# Patient Record
Sex: MALE | Race: WHITE | Employment: OTHER | URBAN - METROPOLITAN AREA
[De-identification: names, ages, dates, MRNs, and addresses within clinical notes are randomized per-mention and may not be internally consistent; named-entity substitution may affect disease eponyms.]

---

## 2018-08-27 ENCOUNTER — HOSPITAL ENCOUNTER (OUTPATIENT)
Dept: GENERAL RADIOLOGY | Age: 83
Discharge: HOME | End: 2018-08-27
Payer: MEDICARE

## 2018-08-27 DIAGNOSIS — R13.10 PROBLEMS WITH SWALLOWING AND MASTICATION: ICD-10-CM

## 2018-08-27 PROCEDURE — 74220 X-RAY XM ESOPHAGUS 1CNTRST: CPT

## 2018-08-28 ENCOUNTER — HOSPITAL ENCOUNTER (OUTPATIENT)
Dept: SPEECH THERAPY | Age: 83
Setting detail: THERAPIES SERIES
Discharge: HOME | End: 2018-08-28
Payer: MEDICARE

## 2018-08-28 DIAGNOSIS — F44.4 HYPERFUNCTIONAL DYSPHONIA: Primary | ICD-10-CM

## 2018-08-28 PROCEDURE — G9172 VOICE GOAL STATUS: HCPCS

## 2018-08-28 PROCEDURE — G9171 VOICE CURRENT STATUS: HCPCS

## 2018-08-28 PROCEDURE — 92524 BEHAVRAL QUALIT ANALYS VOICE: CPT

## 2018-08-28 NOTE — PROGRESS NOTES
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  ICD-9-CM: 300.11          Speech Therapy Certification Form    Plan of Care/Treatment to date:  [x] Speech-Language Evaluation/Treatment    [] Dysphagia Evaluation/Treatment        [] Dysphagia Treatment via Neuromuscular Electrical Stimulation (NMES)   [] Modified Barium Swallowing Study   [] Fiberoptic Endoscopic Evaluation of Swallowing (FEES)  [] Cognitive-Linguistic Skills Development  [x] Voice evaluation and Treatment      [] Evaluation, modification, and Training of Voice Prosthetic     [] Evaluation for Speech-Generating Augmentative and Alternative Communication Device   [] Therapeutic Services for the use of Speech-Generating Device. [] Other:          Frequency/Duration:  # Days per week: [] 1 day # Weeks: [] 1 week [] 5 weeks      [x] 2 days? [] 2 weeks [x] 6 weeks     [] 3 days   [] 3 weeks [] 7 weeks     [] 4 days   [] 4 weeks [] 8 weeks    Rehab Potential: [] Excellent [x] Good [] Fair  [] Poor       Electronically signed by: PERICO Hernandez  Phone: [phone]  Fax: [phone]    If you have any questions or concerns, please don't hesitate to call.   Thank you for your referral.      Physician Signature:________________________________Date:__________________  By signing above, therapists plan is approved by physician      Davina Hitchcock Runkelen  PP#[id number]  Speech-Language Pathologist  Phone #: [phone]  Fax #: [phone]

## 2018-08-28 NOTE — PROGRESS NOTES
[facility] - Voice      Patient: Yinka Richmond  : 1931  MRN: [id number]  Date: 2018  Electronically Signed by: Catarina Garcia MA, CCC-SLP       Note: This FCM should not be used for individuals who have had a laryngectomy or tracheotomy, or for individuals with resonance disorders. []  Level 1 The individual is unable to use voice to communicate. Alternative means for communicating are used all of the time. The individual cannot participate in vocational, avocaional, and social activities requiring voice. []  Level 2 Voice is not functional for communication most of the time. Alternative means for communicating must be used most of the time. The individual's participation in vocational, avocational, and social activities is significantly limited all of the time.      []  Level 3  Voice is functional for communication, but is consistently distracting and interferes with communication by drawing attention to itself. Participation in vocational, avocational, and social activities is limited most of the time. [x]  Level 4 Voice is functional for communication, but sometimes distracting. The individual's ability to participate in vocational, avocational, and social activities requiring voice is occasionally affected in low-vocal demand activities, but consistently affected in high-vocal demand activities. []  Level 5 Voice occasionally sounds normal with self-monitoring, but there is some situational variation. The individual's ability to participate in vocational, avocational, and social activities requiring voice is rarely affected in low-vocal demand activities, but is occasionally affected in high-vocal demand activities. []  Level 6 Voice sounds normal most of the time across all settings and situations. Self-monitoring is consistent when needed.   The individual's ability to participate in vocational, avocational, and social activities requiring voice is not affected in low-vocal demand activities, but is rarely affected in high-vocal demand activities. []  Level 7 The individual's ability to successfully and independently participate in vocational, avocational, and social activities requiring high-or low-vocal demands is not limited by voice. Self-monitoring is effectively used, but only occasionally needed.

## 2018-09-05 ENCOUNTER — HOSPITAL ENCOUNTER (OUTPATIENT)
Dept: SPEECH THERAPY | Age: 83
Setting detail: THERAPIES SERIES
Discharge: HOME | End: 2018-09-05
Payer: MEDICARE

## 2018-09-05 PROCEDURE — 92507 TX SP LANG VOICE COMM INDIV: CPT

## 2018-09-10 ENCOUNTER — HOSPITAL ENCOUNTER (OUTPATIENT)
Dept: SPEECH THERAPY | Age: 83
Setting detail: THERAPIES SERIES
Discharge: HOME | End: 2018-09-10
Payer: MEDICARE

## 2018-09-10 PROCEDURE — 92507 TX SP LANG VOICE COMM INDIV: CPT

## 2018-09-10 NOTE — FLOWSHEET NOTE
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 He was prompt to today's session. He again reported that he has increased his daily water intake (stated he had ~40 oz of water yesterday). The pt reported completing previously reviewed exercises daily and without difficulty. Objective:   Short-term Goals:  Goal 1: The pt will prolong a single vowel production for 12 seconds with max cues to target improved breath support:  - Goal targeted indirectly through other treatment tasks. Goal 2: The pt will verbally produce single sentence at an average of 75 dB or >, mod cues for improved volume:  - Goal targeted indirectly through other treatment tasks. Goal 3: The pt will verbally produce single sentences without dysphonia with 70% acccuracy, max cues:  - Goal targeted indirectly through other treatment tasks. Other Treatments:  - Today's session reviewed hyperfunctional vocal exercises, respiratory retraining techniques and vocal function exercises to indirectly target his current goals listed above. The pt benefited from min cues to complete majority of exercises.     - Hyperfunctional Voice Exercises:  · Raspberries: x 5 (significnat improvement noted; pt able to produce raspberries without any noted tension)  · Use of Kazoo (mid pitch

## 2018-09-11 ENCOUNTER — APPOINTMENT (OUTPATIENT)
Dept: SPEECH THERAPY | Age: 83
End: 2018-09-11
Payer: MEDICARE

## 2018-09-13 ENCOUNTER — HOSPITAL ENCOUNTER (OUTPATIENT)
Dept: SPEECH THERAPY | Age: 83
Setting detail: THERAPIES SERIES
Discharge: HOME | End: 2018-09-13
Payer: MEDICARE

## 2018-09-13 PROCEDURE — 92507 TX SP LANG VOICE COMM INDIV: CPT

## 2018-09-13 NOTE — FLOWSHEET NOTE
Speech-Language Pathology  Daily Treatment Note    Date:  2018    Patient Name:  Naren Contreras    :  1931  MRN: [id number]    Treatment Diagnosis:    Visit Diagnoses        Codes     Hyperfunctional dysphonia    -  Primary ICD-10-CM: F44.4  ICD-9-CM: 300.11       Insurance/Certification information:  [facility]  Referring Physician: Eda Lane MD    Plan of care signed (Y/N): Faxed   Visit# / total visits:   per MD  Pain level: 0/10     G-Code (if applicable):      Date / Visit # G-Code Applied:  /  Current:       18  Goal:  [address]     18    Progress Note: []  Yes  [x]  No  Next due by: Visit #10: 4/10 or 18      Subjective: The pt was his pleasant self. He reported having ~48 oz of water yesterday. He also stated his voice \"seems to be more hoarse today\" and that his voice \"seems to be better in the afternoon vs the mornings after I've been drinking water all day\". Objective:   Short-term Goals:  Goal 1: The pt will prolong a single vowel production for 12 seconds with max cues to target improved breath support:  - /e/: 10 seconds (avg of 3 trials); diaphragmatic breathing was reviewed prior to completion of this exercise     Goal 2: The pt will verbally produce single sentence at an average of 75 dB or >, mod cues for improved volume:  - Goal targeted indirectly through other treatment tasks. Goal 3: The pt will verbally produce single sentences without dysphonia with 70% acccuracy, max cues:  - Goal targeted indirectly through other treatment tasks. Other Treatments:  - Hyperfunctional Voice Exercises:  · Raspberries: x 5 (pt able to produce raspberries without any noted tension)  · Use of Kazoo (mid pitch level, mid to high pitch level and mid to low pitch level): did not directly target this date. Pt reported that he had left his Kazoo at home. · Resonant voice therapy (/M/ and /M/ with singing. Thyra Hermanns Thyra Hermanns \"me, my, mum, me\"):  · 1-syllable words

## 2018-09-17 ENCOUNTER — HOSPITAL ENCOUNTER (OUTPATIENT)
Dept: SPEECH THERAPY | Age: 83
Setting detail: THERAPIES SERIES
Discharge: HOME | End: 2018-09-17
Payer: MEDICARE

## 2018-09-17 PROCEDURE — 92507 TX SP LANG VOICE COMM INDIV: CPT

## 2018-09-17 NOTE — FLOWSHEET NOTE
Speech-Language Pathology  Daily Treatment Note    Date:  2018    Patient Name:  Damion Issa    :  1931  MRN: [id number]    Treatment Diagnosis:    Visit Diagnoses        Codes     Hyperfunctional dysphonia    -  Primary ICD-10-CM: F44.4  ICD-9-CM: 300.11       Insurance/Certification information:  [employer]  Referring Physician: Pa Casey MD    Plan of care signed (Y/N): Faxed   Visit# / total visits:   per MD  Pain level: 0/10     G-Code (if applicable):      Date / Visit # G-Code Applied:  /  Current:       18  Goal:  [address]     18    Progress Note: []  Yes  [x]  No  Next due by: Visit #10: 5/10 or 18      Subjective: The pt was his pleasant self. He stated that his voice was worse  evening so he \"rested\" his voice for the remainder of the evening. Objective:   Short-term Goals:  Goal 1: The pt will prolong a single vowel production for 12 seconds with max cues to target improved breath support:  - /m/ and /a/: 7 seconds (avg of 3 trials); diaphragmatic breathing was reviewed prior to completion of this exercise     Goal 2: The pt will verbally produce single sentence at an average of 75 dB or >, mod cues for improved volume:  - Goal targeted indirectly through other treatment tasks. Goal 3: The pt will verbally produce single sentences without dysphonia with 70% acccuracy, max cues:  - Goal targeted indirectly through other treatment tasks. Other Treatments:  -The Reflux Symptom Index (RSI) was administered this date, which looks at LPR (laryngopharyngeal reflux) and its symptoms and severity. The index includes 9 questions with each being scored from 0-5 (0=no problem at all and 5=severe problem) with a score of 11 or greater considered indicative of LPR.   The pt scored an 8 on the RSI.    - Hyperfunctional Voice Exercises:  · Raspberries: x 5 (pt able to produce raspberries without any noted tension)  -Phonation was added to trials. Pt was also encouraged to complete pitch changes (from low to high and high to low). He was able to complete with min cues. · Use of Kazoo (mid pitch level, mid to high pitch level and mid to low pitch level): did not directly target this date. Pt reported that he had left his Kazoo at home. · Resonant voice therapy (/M/ and /M/ with singing. Mickeal Rink Mickeal Rink \"me, my, mum, me\"):  · 1-syllable words again reviewed with frontal focus stressed. Pt again demonstrated increased hoarseness, so only 1-syllable words targeted     - Respiratory Retraining Exercises: x3-5 reps each  · Leopold Miser inhalation and exhalation  · Exhalation on \"sh\"  · Exhalation on /f/  · Exhalation on /z/  · Expel puff of air on /h/ and stiff through nose with mouth closed  · Expel puff of air on \"sha\" and sniff through nose with mouth closed  · Inhaling and exhaling while laying on back with book on abdomen for biofeedback for correct diaphragmatic breathing: did not directly target this date  · Inhale and exhale through nose  · Inhale through nose and exhale through pursed lips  · Inhale and exhale through pursed lips  · Inhale through nose and exhale through a straw  · Inhale and exhale through a straw  · Sniff x 2 and exhale through a straw  · Sniff x 2 and exhale though pursed lips  · Say \"ah\" projecting voice to the ceiling  · Dry swallows    - Vocal Function Exercises: did not directly target this date  · Prolong /e/ on musical note F  · Glide from lowest to highest note on /o/  · Glide from highest to lowest note on /o/  · Sustain /o/ on the following musical notes: C, D, E, F, G    -Easy Onset Exercises: reducing glottal attack / tension was emphasized   · Easy onset exercises reviewed using /h/ phoneme at the single word and single syllable level. Assessment:   Pt presented with increased hoarseness this date throughout exercises. Plan: Continue voice treatment 2 x week for 6 weeks.     Timed Code Treatment: 0 minutes    Total

## 2018-09-18 ENCOUNTER — APPOINTMENT (OUTPATIENT)
Dept: SPEECH THERAPY | Age: 83
End: 2018-09-18
Payer: MEDICARE

## 2018-09-20 ENCOUNTER — HOSPITAL ENCOUNTER (OUTPATIENT)
Dept: SPEECH THERAPY | Age: 83
Setting detail: THERAPIES SERIES
Discharge: HOME | End: 2018-09-20
Payer: MEDICARE

## 2018-09-20 PROCEDURE — 92507 TX SP LANG VOICE COMM INDIV: CPT

## 2018-09-24 ENCOUNTER — HOSPITAL ENCOUNTER (OUTPATIENT)
Dept: SPEECH THERAPY | Age: 83
Setting detail: THERAPIES SERIES
Discharge: HOME | End: 2018-09-24
Payer: MEDICARE

## 2018-09-24 PROCEDURE — 92507 TX SP LANG VOICE COMM INDIV: CPT

## 2018-09-24 NOTE — FLOWSHEET NOTE
Speech-Language Pathology  Daily Treatment Note    Date:  2018    Patient Name:  Marcia Santos    :  1931  MRN: [id number]    Treatment Diagnosis:    Visit Diagnoses        Codes     Hyperfunctional dysphonia    -  Primary ICD-10-CM: F44.4  ICD-9-CM: 300.11       Insurance/Certification information:  [facility] [employer]  Referring Physician: Gonzalo Chisholm MD    Plan of care signed (Y/N): Faxed   Visit# / total visits:   per MD  Pain level: 0/10     G-Code (if applicable):      Date / Visit # G-Code Applied:  /  Current:       18  Goal:  [address] [place] [address]     18    Progress Note: []  Yes  [x]  No  Next due by: Visit #10: 7/10 or 18      Subjective:    SLP noted that patient's voice sounded rough and patient responded, \"it is rough when I get up\". The pt said that his voice continues to be raspy at times but sometimes it is almost normal. The patient also noted feeling acid reflux last night that could be d/t bean soup he consumed and is in the process of scheduling an appointment with his doctor regarding his reflux. The pt said that, other than the morning, his voice is sounding more normal overall. Objective:   Short-term Goals:  Goal 1: The pt will prolong a single vowel production for 12 seconds with max cues to target improved breath support:  - \"Ah\": Up to 6.4 seconds with max cues for breath support out of 4 trials (6.4, 4.81, and 5.56 seconds). This was completed at the end of the session and his shorter production could be d/t VFE. Goal 2: The pt will verbally produce single sentence at an average of 75 dB or >, mod cues for improved volume:  - Goal targeted indirectly through other treatment tasks. Goal 3: The pt will verbally produce single sentences without dysphonia with 70% acccuracy, max cues:  - 80% (8/10) with max cues; targeted via having the pt repeat single sentences aloud.   GOAL MET    NEW GOAL:   The pt will verbally produce single sentences without

## 2018-09-27 ENCOUNTER — HOSPITAL ENCOUNTER (OUTPATIENT)
Dept: SPEECH THERAPY | Age: 83
Setting detail: THERAPIES SERIES
Discharge: HOME | End: 2018-09-27
Payer: MEDICARE

## 2018-09-27 PROCEDURE — 92507 TX SP LANG VOICE COMM INDIV: CPT

## 2018-09-27 PROCEDURE — G9172 VOICE GOAL STATUS: HCPCS

## 2018-09-27 PROCEDURE — G9173 VOICE D/C STATUS: HCPCS

## 2018-09-27 NOTE — PROGRESS NOTES
Outpatient Speech Therapy   Phone: [phone] Fax: [phone]    Speech Therapy Discharge Note         The following patient has been evaluated for therapy services. Please review the attached summary of the patient's plan of care, and verify that you agree with plan for additional therapy services at this time.      Thank you for the referral of this patient. Please sign the attached certification form.      Physician signature_______________________ Date________________  Waldo Salguero to: [facility] [phone]         Date: 2018        Patient Name:  Keith Aden    :  1931  MRN: [id number]  Treatment Diagnosis:         Visit Diagnoses        Codes     Hyperfunctional dysphonia    -  Primary ICD-10-CM: F44.4  ICD-9-CM: 300.11       Insurance/Certification information:  [place]  Referring Physician: Petra Meza MD    Plan of care signed (Y/N): Faxed   Visit# / total visits:   per MD  Pain level:      0/10          G-Code (if applicable):                                             Date / Visit # G-Code Applied:  /  Current:                                                       18  Goal:  [facility]                                                         18     Time Period for Report:  18 to 18 (8 sessions)  Cancels/No-shows to date:  None    Plan of Care/Treatment to date:  [] Speech-Language Evaluation/Treatment    [] Dysphagia Evaluation/Treatment        [] Dysphagia Treatment via Neuromuscular Electrical Stimulation (NMES)   [] Modified Barium Swallowing Study  [] Fiberoptic Endoscopic Evaluation of Swallowing (FEES)    [] Cognitive-Linguistic Skills Development  [x] Voice evaluation and Treatment      [] Evaluation, modification, and Training of Voice Prosthetic     [] Evaluation for Speech-Generating Augmentative and Alternative Communication Device   [] Therapeutic Services for the use of Speech-Generating Device.    []

## 2019-09-20 ENCOUNTER — HOSPITAL ENCOUNTER (OUTPATIENT)
Dept: CARDIAC REHAB | Age: 84
Setting detail: THERAPIES SERIES
Discharge: HOME | End: 2019-09-20
Payer: MEDICARE

## 2019-09-20 PROCEDURE — 93798 PHYS/QHP OP CAR RHAB W/ECG: CPT

## 2019-09-23 ENCOUNTER — HOSPITAL ENCOUNTER (OUTPATIENT)
Dept: CARDIAC REHAB | Age: 84
Setting detail: THERAPIES SERIES
Discharge: HOME | End: 2019-09-23
Payer: MEDICARE

## 2019-09-23 PROCEDURE — 93798 PHYS/QHP OP CAR RHAB W/ECG: CPT

## 2019-09-25 ENCOUNTER — HOSPITAL ENCOUNTER (OUTPATIENT)
Dept: CARDIAC REHAB | Age: 84
Setting detail: THERAPIES SERIES
Discharge: HOME | End: 2019-09-25
Payer: MEDICARE

## 2019-09-25 PROCEDURE — 93798 PHYS/QHP OP CAR RHAB W/ECG: CPT

## 2019-09-27 ENCOUNTER — HOSPITAL ENCOUNTER (OUTPATIENT)
Dept: CARDIAC REHAB | Age: 84
Setting detail: THERAPIES SERIES
Discharge: HOME | End: 2019-09-27
Payer: MEDICARE

## 2019-09-27 PROCEDURE — 93798 PHYS/QHP OP CAR RHAB W/ECG: CPT

## 2019-09-30 ENCOUNTER — HOSPITAL ENCOUNTER (OUTPATIENT)
Dept: CARDIAC REHAB | Age: 84
Setting detail: THERAPIES SERIES
Discharge: HOME | End: 2019-09-30
Payer: MEDICARE

## 2019-09-30 PROCEDURE — 93798 PHYS/QHP OP CAR RHAB W/ECG: CPT

## 2019-10-02 ENCOUNTER — HOSPITAL ENCOUNTER (OUTPATIENT)
Dept: CARDIAC REHAB | Age: 84
Setting detail: THERAPIES SERIES
Discharge: HOME | End: 2019-10-02
Payer: MEDICARE

## 2019-10-02 PROCEDURE — 93798 PHYS/QHP OP CAR RHAB W/ECG: CPT

## 2019-10-04 ENCOUNTER — HOSPITAL ENCOUNTER (OUTPATIENT)
Dept: CARDIAC REHAB | Age: 84
Setting detail: THERAPIES SERIES
Discharge: HOME | End: 2019-10-04
Payer: MEDICARE

## 2019-10-04 PROCEDURE — 93798 PHYS/QHP OP CAR RHAB W/ECG: CPT

## 2019-10-07 ENCOUNTER — HOSPITAL ENCOUNTER (OUTPATIENT)
Dept: CARDIAC REHAB | Age: 84
Setting detail: THERAPIES SERIES
Discharge: HOME | End: 2019-10-07
Payer: MEDICARE

## 2019-10-07 PROCEDURE — 93798 PHYS/QHP OP CAR RHAB W/ECG: CPT

## 2019-10-09 ENCOUNTER — HOSPITAL ENCOUNTER (OUTPATIENT)
Dept: CARDIAC REHAB | Age: 84
Setting detail: THERAPIES SERIES
Discharge: HOME | End: 2019-10-09
Payer: MEDICARE

## 2019-10-09 PROCEDURE — 93798 PHYS/QHP OP CAR RHAB W/ECG: CPT

## 2019-10-11 ENCOUNTER — HOSPITAL ENCOUNTER (OUTPATIENT)
Dept: CARDIAC REHAB | Age: 84
Setting detail: THERAPIES SERIES
Discharge: HOME | End: 2019-10-11
Payer: MEDICARE

## 2019-10-11 PROCEDURE — 93798 PHYS/QHP OP CAR RHAB W/ECG: CPT

## 2019-10-14 ENCOUNTER — HOSPITAL ENCOUNTER (OUTPATIENT)
Dept: CARDIAC REHAB | Age: 84
Setting detail: THERAPIES SERIES
Discharge: HOME | End: 2019-10-14
Payer: MEDICARE

## 2019-10-14 PROCEDURE — 93798 PHYS/QHP OP CAR RHAB W/ECG: CPT

## 2019-10-16 ENCOUNTER — HOSPITAL ENCOUNTER (OUTPATIENT)
Dept: CARDIAC REHAB | Age: 84
Setting detail: THERAPIES SERIES
Discharge: HOME | End: 2019-10-16
Payer: MEDICARE

## 2019-10-16 PROCEDURE — 93798 PHYS/QHP OP CAR RHAB W/ECG: CPT

## 2019-10-18 ENCOUNTER — HOSPITAL ENCOUNTER (OUTPATIENT)
Dept: CARDIAC REHAB | Age: 84
Setting detail: THERAPIES SERIES
Discharge: HOME | End: 2019-10-18
Payer: MEDICARE

## 2019-10-18 PROCEDURE — 93798 PHYS/QHP OP CAR RHAB W/ECG: CPT

## 2019-10-21 ENCOUNTER — HOSPITAL ENCOUNTER (OUTPATIENT)
Dept: CARDIAC REHAB | Age: 84
Setting detail: THERAPIES SERIES
Discharge: HOME | End: 2019-10-21
Payer: MEDICARE

## 2019-10-21 PROCEDURE — 93798 PHYS/QHP OP CAR RHAB W/ECG: CPT

## 2019-10-25 ENCOUNTER — HOSPITAL ENCOUNTER (OUTPATIENT)
Dept: CARDIAC REHAB | Age: 84
Setting detail: THERAPIES SERIES
Discharge: HOME | End: 2019-10-25
Payer: MEDICARE

## 2019-10-25 PROCEDURE — 93798 PHYS/QHP OP CAR RHAB W/ECG: CPT

## 2019-10-30 ENCOUNTER — HOSPITAL ENCOUNTER (OUTPATIENT)
Dept: CARDIAC REHAB | Age: 84
Setting detail: THERAPIES SERIES
Discharge: HOME | End: 2019-10-30
Payer: MEDICARE

## 2019-10-30 PROCEDURE — 93798 PHYS/QHP OP CAR RHAB W/ECG: CPT

## 2022-05-28 ENCOUNTER — APPOINTMENT (OUTPATIENT)
Dept: CT IMAGING | Age: 87
End: 2022-05-28
Payer: MEDICARE

## 2022-05-28 ENCOUNTER — HOSPITAL ENCOUNTER (EMERGENCY)
Age: 87
Discharge: HOME | End: 2022-05-28
Payer: MEDICARE

## 2022-05-28 VITALS
SYSTOLIC BLOOD PRESSURE: 151 MMHG | WEIGHT: 185 LBS | OXYGEN SATURATION: 99 % | HEIGHT: 70 IN | HEART RATE: 83 BPM | RESPIRATION RATE: 16 BRPM | BODY MASS INDEX: 26.48 KG/M2 | TEMPERATURE: 98.3 F | DIASTOLIC BLOOD PRESSURE: 69 MMHG

## 2022-05-28 DIAGNOSIS — M62.838 TRAPEZIUS MUSCLE SPASM: Primary | ICD-10-CM

## 2022-05-28 LAB
EKG ATRIAL RATE: 81 BPM
EKG DIAGNOSIS: NORMAL
EKG P AXIS: 47 DEGREES
EKG P-R INTERVAL: 156 MS
EKG Q-T INTERVAL: 384 MS
EKG QRS DURATION: 74 MS
EKG QTC CALCULATION (BAZETT): 446 MS
EKG R AXIS: -26 DEGREES
EKG T AXIS: 13 DEGREES
EKG VENTRICULAR RATE: 81 BPM

## 2022-05-28 PROCEDURE — 96376 TX/PRO/DX INJ SAME DRUG ADON: CPT

## 2022-05-28 PROCEDURE — 72125 CT NECK SPINE W/O DYE: CPT

## 2022-05-28 PROCEDURE — 96375 TX/PRO/DX INJ NEW DRUG ADDON: CPT

## 2022-05-28 PROCEDURE — 93005 ELECTROCARDIOGRAM TRACING: CPT | Performed by: EMERGENCY MEDICINE

## 2022-05-28 PROCEDURE — 6360000002 HC RX W HCPCS: Performed by: NURSE PRACTITIONER

## 2022-05-28 PROCEDURE — 96374 THER/PROPH/DIAG INJ IV PUSH: CPT

## 2022-05-28 PROCEDURE — 93010 ELECTROCARDIOGRAM REPORT: CPT | Performed by: INTERNAL MEDICINE

## 2022-05-28 PROCEDURE — 6370000000 HC RX 637 (ALT 250 FOR IP): Performed by: NURSE PRACTITIONER

## 2022-05-28 PROCEDURE — 99284 EMERGENCY DEPT VISIT MOD MDM: CPT

## 2022-05-28 RX ORDER — ORPHENADRINE CITRATE 30 MG/ML
30 INJECTION INTRAMUSCULAR; INTRAVENOUS ONCE
Status: COMPLETED | OUTPATIENT
Start: 2022-05-28 | End: 2022-05-28

## 2022-05-28 RX ORDER — KETOROLAC TROMETHAMINE 30 MG/ML
15 INJECTION, SOLUTION INTRAMUSCULAR; INTRAVENOUS ONCE
Status: COMPLETED | OUTPATIENT
Start: 2022-05-28 | End: 2022-05-28

## 2022-05-28 RX ORDER — LIDOCAINE 4 G/G
1 PATCH TOPICAL ONCE
Status: DISCONTINUED | OUTPATIENT
Start: 2022-05-28 | End: 2022-05-28 | Stop reason: HOSPADM

## 2022-05-28 RX ORDER — METHOCARBAMOL 500 MG/1
500 TABLET, FILM COATED ORAL 4 TIMES DAILY
Qty: 20 TABLET | Refills: 0 | Status: ON HOLD | OUTPATIENT
Start: 2022-05-28 | End: 2022-05-30 | Stop reason: HOSPADM

## 2022-05-28 RX ADMIN — ORPHENADRINE CITRATE 30 MG: 30 INJECTION INTRAMUSCULAR; INTRAVENOUS at 12:54

## 2022-05-28 RX ADMIN — ORPHENADRINE CITRATE 30 MG: 30 INJECTION INTRAMUSCULAR; INTRAVENOUS at 15:12

## 2022-05-28 RX ADMIN — KETOROLAC TROMETHAMINE 15 MG: 30 INJECTION, SOLUTION INTRAMUSCULAR; INTRAVENOUS at 12:53

## 2022-05-28 ASSESSMENT — PAIN DESCRIPTION - ONSET
ONSET: ON-GOING
ONSET: ON-GOING

## 2022-05-28 ASSESSMENT — PAIN DESCRIPTION - DESCRIPTORS
DESCRIPTORS: ACHING
DESCRIPTORS: ACHING

## 2022-05-28 ASSESSMENT — PAIN - FUNCTIONAL ASSESSMENT: PAIN_FUNCTIONAL_ASSESSMENT: 0-10

## 2022-05-28 ASSESSMENT — PAIN DESCRIPTION - FREQUENCY
FREQUENCY: CONTINUOUS
FREQUENCY: CONTINUOUS

## 2022-05-28 ASSESSMENT — PAIN SCALES - GENERAL
PAINLEVEL_OUTOF10: 2
PAINLEVEL_OUTOF10: 8
PAINLEVEL_OUTOF10: 6

## 2022-05-28 ASSESSMENT — PAIN DESCRIPTION - ORIENTATION
ORIENTATION: LEFT
ORIENTATION: LEFT

## 2022-05-28 ASSESSMENT — PAIN DESCRIPTION - LOCATION
LOCATION: SHOULDER;NECK
LOCATION: SHOULDER;NECK

## 2022-05-28 NOTE — ED PROVIDER NOTES
I have reviewed the below EKG. I was not otherwise involved in this patient's care. EKG  The Ekg interpreted by myself  normal sinus rhythm with a rate of 81 with frequent PVCs  Axis is   Normal  QTc is  normal  Intervals and Durations are unremarkable. No specific ST-T wave changes appreciated. No evidence of acute ischemia.    No significant change from prior EKG dated April 28, 2011        Pilo Salazar MD  05/28/22 0713

## 2022-05-28 NOTE — ED PROVIDER NOTES
[facility] Emergency Department    CHIEF COMPLAINT  Shoulder Pain (Left shoulder and neck since yesterday, existing injury to this shoulder. Typical treatments haven't worked, took muslce relaxer PTA.)      HISTORY OF PRESENT ILLNESS  Isaias Hunt is a 80 y.o. male with a pertinent history of asthma, hyperlipidemia, hypertension who presents to the ED complaining of left shoulder and neck pain for the past 2 days. Patient describes pain as a \"spasm. \"  Patient reports this is the fourth \"episode\" he has had over the last several years. Patient reports it is due to a \"old injury. \"  Patient denies any recent fall or direct injury to his shoulder or neck. Patient denies radiation down his left upper extremity. Denies numbness, tingling, extremity weakness. Patient denies fever, chills, body aches, chest pain, shortness of breath, palpitations. Patient denies saddle anesthesia, bowel or bladder incontinence, urinary retention. Patient reports he tried to take some leftover oral muscle relaxants with no relief. He also tried heat and cold with no change. Patient reports in the past has been seen in the emergency department and got muscle relaxants \"through an IV\" and that this helped greatly. No other complaints, modifying factors or associated symptoms. Nursing notes reviewed. Past Medical History:   Diagnosis Date    Asthma     Hyperlipidemia     Hypertension      Past Surgical History:   Procedure Laterality Date    APPENDECTOMY      CARPAL TUNNEL RELEASE      EYE SURGERY  6/28/12    Left Eye Cataract Removal    KNEE SURGERY      right knee    SHOULDER SURGERY      right    TONSILLECTOMY       No family history on file.   Social History     Socioeconomic History    Marital status:      Spouse name: Not on file    Number of children: Not on file    Years of education: Not on file    Highest education level: Not on file   Occupational History    Not on file   Tobacco Use    Smoking status: Former Smoker    Smokeless tobacco: Not on file   Substance and Sexual Activity    Alcohol use: Yes     Alcohol/week: 1.0 standard drink     Types: 1 Cans of beer per week    Drug use: No    Sexual activity: Not on file   Other Topics Concern    Not on file   Social History Narrative    Not on file     Social Determinants of Health     Financial Resource Strain:     Difficulty of Paying Living Expenses: Not on file   Food Insecurity:     Worried About Running Out of Food in the Last Year: Not on file    Cj of Food in the Last Year: Not on file   Transportation Needs:     Lack of Transportation (Medical): Not on file    Lack of Transportation (Non-Medical):  Not on file   Physical Activity:     Days of Exercise per Week: Not on file    Minutes of Exercise per Session: Not on file   Stress:     Feeling of Stress : Not on file   Social Connections:     Frequency of Communication with Friends and Family: Not on file    Frequency of Social Gatherings with Friends and Family: Not on file    Attends Zoroastrianism Services: Not on file    Active Member of [address] [place] [address] or Organizations: Not on file    Attends Club or Organization Meetings: Not on file    Marital Status: Not on file   Intimate Partner Violence:     Fear of Current or Ex-Partner: Not on file    Emotionally Abused: Not on file    Physically Abused: Not on file    Sexually Abused: Not on file   Housing Stability:     Unable to Pay for Housing in the Last Year: Not on file    Number of [place] in the Last Year: Not on file    Unstable Housing in the Last Year: Not on file     Current Facility-Administered Medications   Medication Dose Route Frequency Provider Last Rate Last Admin    lidocaine 4 % external patch 1 patch  1 patch TransDERmal Once JACINTO Harvey CNP   1 patch at 05/28/22 1303     Current Outpatient Medications   Medication Sig Dispense Refill    methocarbamol (ROBAXIN) 500 MG tablet Take 1 tablet by mouth 4 times daily for 5 days 20 tablet 0    rivaroxaban (XARELTO) 20 MG TABS tablet Take 20 mg by mouth      FINASTERIDE PO Take by mouth      methocarbamol (ROBAXIN) 500 MG tablet Take 1 tablet by mouth 4 times daily 20 tablet 0    lidocaine (LIDODERM) 5 % Place 1 patch onto the skin daily 12 hours on, 12 hours off. 7 patch 0    allopurinol (ZYLOPRIM) 100 MG tablet Take 100 mg by mouth daily      azithromycin (ZITHROMAX) 250 MG tablet Take 2 tablets (500 mg) by mouth on  Day 1,  followed by 1 tablet (250 mg) by mouth once daily on Days 2 through 5. 6 tablet 0    omeprazole (PRILOSEC) 20 MG capsule Take 20 mg by mouth daily.  fish oil-omega-3 fatty acids 1000 MG capsule Take 2 g by mouth daily. Indications: stopped 6-3-3123      Garlic 143 MG TABS Take  by mouth.  Cyanocobalamin 1000 MCG TBCR Take 1 tablet by mouth. No Known Allergies    REVIEW OF SYSTEMS  10 systems reviewed, pertinent positives per HPI otherwise noted to be negative    PHYSICAL EXAM  BP (!) 151/69   Pulse 83   Temp 98.3 °F (36.8 °C) (Oral)   Resp 16   Ht 5' 10\" (1.778 m)   Wt 185 lb (83.9 kg)   SpO2 99%   BMI 26.54 kg/m²   GENERAL APPEARANCE: Awake and alert. Cooperative. No acute distress. Vital signs are stable. Well appearing and non toxic. HEAD: Normocephalic. Atraumatic. EYES: PERRL. EOM's grossly intact. ENT: Mucous membranes are moist.   NECK: Supple. Normal ROM. No cervical spine tenderness palpation. No nuchal rigidity. No crepitus or step-off. Significant paraspinal musculature tenderness to the left of the cervical spine and trapezius muscle with spasm palpated. HEART: RRR. Distal pulses are equal and intact. Cap refill less than 2 seconds. LUNGS: Respirations unlabored. CTAB. Good air exchange. Speaking comfortably in full sentences. ABDOMEN: Soft. Non-distended. Non-tender. No guarding or rebound. EXTREMITIES: No peripheral edema.  Moves all extremities equally. All extremities neurovascularly intact. Left upper extremity no bony tenderness to the humerus, clavicle, shoulder blade. No edema, erythema, ecchymosis. Patient able to perform active and passive range of motion. Patient able to perform finger to thumb opposition. Normal strength. Sensation intact. Cap refill less than 2 seconds. Distal pulse intact. Neurovascularly intact. SKIN: Warm and dry. No acute rashes. NEUROLOGICAL: Alert and oriented. No gross facial drooping. Strength 5/5, sensation intact. PSYCHIATRIC: Normal mood and affect. SCREENINGS       RADIOLOGY  CT CERVICAL SPINE WO CONTRAST    Result Date: 5/28/2022  EXAMINATION: CT OF THE CERVICAL SPINE WITHOUT CONTRAST 5/28/2022 12:34 pm TECHNIQUE: CT of the cervical spine was performed without the administration of intravenous contrast. Multiplanar reformatted images are provided for review. Automated exposure control, iterative reconstruction, and/or weight based adjustment of the mA/kV was utilized to reduce the radiation dose to as low as reasonably achievable. COMPARISON: None. HISTORY: ORDERING SYSTEM PROVIDED HISTORY: neck and left shoulder pain spasm no injury TECHNOLOGIST PROVIDED HISTORY: Reason for exam:->neck and left shoulder pain spasm no injury Decision Support Exception - unselect if not a suspected or confirmed emergency medical condition->Emergency Medical Condition (MA) Reason for Exam: pt c/o neck pain and left shoulder pain,no known injury Additional signs and symptoms: pt sates that this has happened in the past Relevant Medical/Surgical History: no surgery FINDINGS: BONES/ALIGNMENT: There is no acute fracture or traumatic malalignment. 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 Spinal canal stenosis is present at C3-4. SOFT TISSUES: Paraspinal soft tissues are normal.  The lung apices are clear. No acute abnormality of the cervical spine. SEPSIS    Is this patient to be included in the SEP-1 Core Measure due to severe sepsis or septic shock? No       ED COURSE/MDM  Patient seen and evaluated. Old records reviewed. Diagnostic testing reviewed and results discussed. I have independently evaluated this patient based upon my scope of practice. Supervising physician was in the department for consultation as needed. Dasia Hernandez presented to the ED today with above noted complaints. Upon arrival to emergency department patient is in a significant amount of pain to his left trapezius muscle he has an ice pack in place upon history and physical examination and reports that this is not helping at all. Patient rates pain 8 out of 10. Initial vital signs are stable slightly hypertensive at 177/73. Afebrile with a heart rate of 79. During RN triage EKG was obtained due to age and complaints of left shoulder pain. EKG interpreted by my attending physician and shows normal sinus rhythm with frequent PVCs. No ST elevation or sign of ischemia after complete history and physical examination I am confident that patient's symptoms are musculoskeletal and not cardiac in nature therefore further cardiac work-up was not obtained. I did obtain a CT of his cervical spine given last imaging was in 2017. CT shows no acute abnormality of the cervical spine. No fracture or traumatic malalignment. Moderate degenerative disc disease and spondylosis throughout the cervical spine spinal canal stenosis is present at C3-4. Paraspinal soft tissues are normal.    Patient was initially treated with IV Norflex and Toradol with a topical lidocaine patch. Upon reevaluation symptoms have greatly improved. Patient resting comfortably. Patient feels comfortable going home. Patient did request a refill on his Robaxin.     While in ED patient received   Medications   lidocaine 4 % external patch 1 patch (1 patch TransDERmal Patch Applied 5/28/22 1303) discharge disposition reasonable. Peterson Lopez and I have discussed the diagnosis and risks, and we agree with discharging home to follow-up with their primary doctor. We also discussed returning to the Emergency Department immediately if new or worsening symptoms occur. We have discussed the symptoms which are most concerning (e.g., saddle anesthesia, urinary or bowel incontinence or retention, changing or worsening pain) that necessitate immediate return. FInal Impression    1. Trapezius muscle spasm        Blood pressure (!) 151/69, pulse 83, temperature 98.3 °F (36.8 °C), temperature source Oral, resp. rate 16, height 5' 10\" (1.778 m), weight 185 lb (83.9 kg), SpO2 99 %.  mdm    Patient was sent home with a prescription for below medication/s. I did [place] patient on appropriate use of these medication. New Prescriptions    METHOCARBAMOL (ROBAXIN) 500 MG TABLET    Take 1 tablet by mouth 4 times daily for 5 days           FOLLOW UP  Colton Castleman, MD  [phone]  [facility]  ED  [address]          DISPOSITION  Patient was discharged to home in good condition. Comment: Please note this report has been produced using speech recognition software and may contain errors related to that system including errors in grammar, punctuation, and spelling, as well as words and phrases that may be inappropriate. If there are any questions or concerns please feel free to contact the dictating provider for clarification.             Tarsha Garcia, JACINTO - CNP  05/28/22 2 [facility] Peteyarleen Geraldine - [facility]  05/28/22 2531

## 2022-05-28 NOTE — ED TRIAGE NOTES
Patient identified as a positive fall risk on the ED triage fall screening. Patient placed in fall precautions which includes:  yellow fall risk bracelet on wrist and yellow socks on feet. Patient instructed on importance of not getting out of bed or ambulating without assistance for safety. Pt verbalized understanding. Left shoulder pain radiating into neck. Existing injury to left shoulder and neck,. Pain started yesterday, typical remedies have been ineffective. Took muscle relaxer PTA, no relief. Describes pain as spasm. Pt due for heart valve replacement at [facility] on 6/2.

## 2022-05-29 ENCOUNTER — HOSPITAL ENCOUNTER (OUTPATIENT)
Age: 87
Setting detail: OBSERVATION
Discharge: HOME | End: 2022-05-30
Attending: EMERGENCY MEDICINE | Admitting: INTERNAL MEDICINE
Payer: MEDICARE

## 2022-05-29 DIAGNOSIS — M43.6 TORTICOLLIS: Primary | ICD-10-CM

## 2022-05-29 PROBLEM — N18.31 STAGE 3A CHRONIC KIDNEY DISEASE (HCC): Status: ACTIVE | Noted: 2022-05-29

## 2022-05-29 PROBLEM — J44.9 ASTHMA-COPD OVERLAP SYNDROME (HCC): Status: ACTIVE | Noted: 2022-05-29

## 2022-05-29 PROBLEM — J44.89 ASTHMA-COPD OVERLAP SYNDROME (HCC): Status: ACTIVE | Noted: 2022-05-29

## 2022-05-29 PROBLEM — Z98.61 CAD S/P PERCUTANEOUS CORONARY ANGIOPLASTY: Status: ACTIVE | Noted: 2022-05-29

## 2022-05-29 PROBLEM — I25.10 CAD (CORONARY ARTERY DISEASE): Status: ACTIVE | Noted: 2022-05-29

## 2022-05-29 PROBLEM — I65.22 LEFT CAROTID ARTERY OCCLUSION: Status: ACTIVE | Noted: 2022-05-29

## 2022-05-29 PROBLEM — I35.0 SEVERE AORTIC STENOSIS: Status: ACTIVE | Noted: 2022-05-29

## 2022-05-29 LAB
ANION GAP SERPL CALCULATED.3IONS-SCNC: 10 MMOL/L (ref 3–16)
BASOPHILS ABSOLUTE: 0 K/UL (ref 0–0.2)
BASOPHILS RELATIVE PERCENT: 0.4 %
BUN BLDV-MCNC: 36 MG/DL (ref 7–20)
CALCIUM SERPL-MCNC: 8.9 MG/DL (ref 8.3–10.6)
CHLORIDE BLD-SCNC: 101 MMOL/L (ref 99–110)
CO2: 24 MMOL/L (ref 21–32)
CREAT SERPL-MCNC: 1.6 MG/DL (ref 0.8–1.3)
EOSINOPHILS ABSOLUTE: 0 K/UL (ref 0–0.6)
EOSINOPHILS RELATIVE PERCENT: 0 %
GFR AFRICAN AMERICAN: 49
GFR NON-AFRICAN AMERICAN: 41
GLUCOSE BLD-MCNC: 152 MG/DL (ref 70–99)
HCT VFR BLD CALC: 39.9 % (ref 40.5–52.5)
HEMOGLOBIN: 13.6 G/DL (ref 13.5–17.5)
LYMPHOCYTES ABSOLUTE: 0.7 K/UL (ref 1–5.1)
LYMPHOCYTES RELATIVE PERCENT: 6.1 %
MCH RBC QN AUTO: 31.6 PG (ref 26–34)
MCHC RBC AUTO-ENTMCNC: 34 G/DL (ref 31–36)
MCV RBC AUTO: 92.9 FL (ref 80–100)
MONOCYTES ABSOLUTE: 1.1 K/UL (ref 0–1.3)
MONOCYTES RELATIVE PERCENT: 10.5 %
NEUTROPHILS ABSOLUTE: 8.9 K/UL (ref 1.7–7.7)
NEUTROPHILS RELATIVE PERCENT: 83 %
PDW BLD-RTO: 14.2 % (ref 12.4–15.4)
PLATELET # BLD: 202 K/UL (ref 135–450)
PMV BLD AUTO: 8.6 FL (ref 5–10.5)
POTASSIUM REFLEX MAGNESIUM: 3.7 MMOL/L (ref 3.5–5.1)
RBC # BLD: 4.3 M/UL (ref 4.2–5.9)
SODIUM BLD-SCNC: 135 MMOL/L (ref 136–145)
WBC # BLD: 10.7 K/UL (ref 4–11)

## 2022-05-29 PROCEDURE — 97535 SELF CARE MNGMENT TRAINING: CPT

## 2022-05-29 PROCEDURE — 80048 BASIC METABOLIC PNL TOTAL CA: CPT

## 2022-05-29 PROCEDURE — 6370000000 HC RX 637 (ALT 250 FOR IP): Performed by: INTERNAL MEDICINE

## 2022-05-29 PROCEDURE — 96374 THER/PROPH/DIAG INJ IV PUSH: CPT

## 2022-05-29 PROCEDURE — 6360000002 HC RX W HCPCS

## 2022-05-29 PROCEDURE — 6360000002 HC RX W HCPCS: Performed by: EMERGENCY MEDICINE

## 2022-05-29 PROCEDURE — G0378 HOSPITAL OBSERVATION PER HR: HCPCS

## 2022-05-29 PROCEDURE — 6370000000 HC RX 637 (ALT 250 FOR IP): Performed by: EMERGENCY MEDICINE

## 2022-05-29 PROCEDURE — 99285 EMERGENCY DEPT VISIT HI MDM: CPT

## 2022-05-29 PROCEDURE — 6370000000 HC RX 637 (ALT 250 FOR IP)

## 2022-05-29 PROCEDURE — 6360000002 HC RX W HCPCS: Performed by: INTERNAL MEDICINE

## 2022-05-29 PROCEDURE — 96375 TX/PRO/DX INJ NEW DRUG ADDON: CPT

## 2022-05-29 PROCEDURE — 85025 COMPLETE CBC W/AUTO DIFF WBC: CPT

## 2022-05-29 PROCEDURE — 96376 TX/PRO/DX INJ SAME DRUG ADON: CPT

## 2022-05-29 PROCEDURE — 97530 THERAPEUTIC ACTIVITIES: CPT

## 2022-05-29 PROCEDURE — 97165 OT EVAL LOW COMPLEX 30 MIN: CPT

## 2022-05-29 RX ORDER — LOSARTAN POTASSIUM 25 MG/1
25 TABLET ORAL NIGHTLY
Status: DISCONTINUED | OUTPATIENT
Start: 2022-05-29 | End: 2022-05-30 | Stop reason: HOSPADM

## 2022-05-29 RX ORDER — SODIUM CHLORIDE 9 MG/ML
INJECTION, SOLUTION INTRAVENOUS PRN
Status: DISCONTINUED | OUTPATIENT
Start: 2022-05-29 | End: 2022-05-30 | Stop reason: HOSPADM

## 2022-05-29 RX ORDER — MAGNESIUM SULFATE 1 G/100ML
1000 INJECTION INTRAVENOUS PRN
Status: DISCONTINUED | OUTPATIENT
Start: 2022-05-29 | End: 2022-05-30

## 2022-05-29 RX ORDER — KETOROLAC TROMETHAMINE 30 MG/ML
30 INJECTION, SOLUTION INTRAMUSCULAR; INTRAVENOUS ONCE
Status: COMPLETED | OUTPATIENT
Start: 2022-05-29 | End: 2022-05-29

## 2022-05-29 RX ORDER — ALLOPURINOL 100 MG/1
100 TABLET ORAL DAILY
Status: DISCONTINUED | OUTPATIENT
Start: 2022-05-29 | End: 2022-05-30 | Stop reason: HOSPADM

## 2022-05-29 RX ORDER — ACETAMINOPHEN 650 MG/1
650 SUPPOSITORY RECTAL EVERY 6 HOURS PRN
Status: DISCONTINUED | OUTPATIENT
Start: 2022-05-29 | End: 2022-05-30 | Stop reason: HOSPADM

## 2022-05-29 RX ORDER — ACETAMINOPHEN 325 MG/1
650 TABLET ORAL EVERY 6 HOURS PRN
Status: DISCONTINUED | OUTPATIENT
Start: 2022-05-29 | End: 2022-05-30 | Stop reason: HOSPADM

## 2022-05-29 RX ORDER — LORAZEPAM 2 MG/ML
0.5 INJECTION INTRAMUSCULAR EVERY 6 HOURS PRN
Status: DISCONTINUED | OUTPATIENT
Start: 2022-05-29 | End: 2022-05-30 | Stop reason: HOSPADM

## 2022-05-29 RX ORDER — LIDOCAINE 4 G/G
PATCH TOPICAL
Status: COMPLETED
Start: 2022-05-29 | End: 2022-05-29

## 2022-05-29 RX ORDER — DIAZEPAM 5 MG/1
5 TABLET ORAL ONCE
Status: COMPLETED | OUTPATIENT
Start: 2022-05-29 | End: 2022-05-29

## 2022-05-29 RX ORDER — CLOPIDOGREL BISULFATE 75 MG/1
75 TABLET ORAL DAILY
Status: DISPENSED | OUTPATIENT
Start: 2022-05-29 | End: 2022-05-30

## 2022-05-29 RX ORDER — POTASSIUM CHLORIDE 20 MEQ/1
40 TABLET, EXTENDED RELEASE ORAL PRN
Status: DISCONTINUED | OUTPATIENT
Start: 2022-05-29 | End: 2022-05-30

## 2022-05-29 RX ORDER — SODIUM CHLORIDE 0.9 % (FLUSH) 0.9 %
10 SYRINGE (ML) INJECTION PRN
Status: DISCONTINUED | OUTPATIENT
Start: 2022-05-29 | End: 2022-05-30 | Stop reason: HOSPADM

## 2022-05-29 RX ORDER — ORPHENADRINE CITRATE 30 MG/ML
60 INJECTION INTRAMUSCULAR; INTRAVENOUS ONCE
Status: COMPLETED | OUTPATIENT
Start: 2022-05-29 | End: 2022-05-29

## 2022-05-29 RX ORDER — ORPHENADRINE CITRATE 30 MG/ML
60 INJECTION INTRAMUSCULAR; INTRAVENOUS EVERY 12 HOURS PRN
Status: DISCONTINUED | OUTPATIENT
Start: 2022-05-29 | End: 2022-05-30 | Stop reason: HOSPADM

## 2022-05-29 RX ORDER — KETOROLAC TROMETHAMINE 30 MG/ML
INJECTION, SOLUTION INTRAMUSCULAR; INTRAVENOUS
Status: COMPLETED
Start: 2022-05-29 | End: 2022-05-29

## 2022-05-29 RX ORDER — ONDANSETRON 4 MG/1
4 TABLET, ORALLY DISINTEGRATING ORAL EVERY 8 HOURS PRN
Status: DISCONTINUED | OUTPATIENT
Start: 2022-05-29 | End: 2022-05-30 | Stop reason: HOSPADM

## 2022-05-29 RX ORDER — LORAZEPAM 2 MG/ML
1 INJECTION INTRAMUSCULAR ONCE
Status: COMPLETED | OUTPATIENT
Start: 2022-05-29 | End: 2022-05-29

## 2022-05-29 RX ORDER — ORPHENADRINE CITRATE 30 MG/ML
INJECTION INTRAMUSCULAR; INTRAVENOUS
Status: COMPLETED
Start: 2022-05-29 | End: 2022-05-29

## 2022-05-29 RX ORDER — LIDOCAINE 4 G/G
1 PATCH TOPICAL DAILY
Status: DISCONTINUED | OUTPATIENT
Start: 2022-05-29 | End: 2022-05-30 | Stop reason: HOSPADM

## 2022-05-29 RX ORDER — ONDANSETRON 2 MG/ML
4 INJECTION INTRAMUSCULAR; INTRAVENOUS EVERY 6 HOURS PRN
Status: DISCONTINUED | OUTPATIENT
Start: 2022-05-29 | End: 2022-05-30 | Stop reason: HOSPADM

## 2022-05-29 RX ORDER — PANTOPRAZOLE SODIUM 40 MG/1
40 TABLET, DELAYED RELEASE ORAL
Status: DISCONTINUED | OUTPATIENT
Start: 2022-05-29 | End: 2022-05-30 | Stop reason: HOSPADM

## 2022-05-29 RX ORDER — TRAZODONE HYDROCHLORIDE 50 MG/1
50 TABLET ORAL NIGHTLY
Status: DISCONTINUED | OUTPATIENT
Start: 2022-05-29 | End: 2022-05-30 | Stop reason: HOSPADM

## 2022-05-29 RX ORDER — ENOXAPARIN SODIUM 100 MG/ML
40 INJECTION SUBCUTANEOUS DAILY
Status: DISCONTINUED | OUTPATIENT
Start: 2022-05-29 | End: 2022-05-30 | Stop reason: HOSPADM

## 2022-05-29 RX ORDER — FINASTERIDE 5 MG/1
5 TABLET, FILM COATED ORAL DAILY
Status: DISCONTINUED | OUTPATIENT
Start: 2022-05-29 | End: 2022-05-30 | Stop reason: HOSPADM

## 2022-05-29 RX ORDER — CYCLOBENZAPRINE HCL 10 MG
5 TABLET ORAL 3 TIMES DAILY PRN
Status: DISCONTINUED | OUTPATIENT
Start: 2022-05-29 | End: 2022-05-30 | Stop reason: HOSPADM

## 2022-05-29 RX ORDER — KETOROLAC TROMETHAMINE 30 MG/ML
15 INJECTION, SOLUTION INTRAMUSCULAR; INTRAVENOUS EVERY 6 HOURS PRN
Status: DISCONTINUED | OUTPATIENT
Start: 2022-05-29 | End: 2022-05-30 | Stop reason: HOSPADM

## 2022-05-29 RX ORDER — AMLODIPINE BESYLATE 5 MG/1
5 TABLET ORAL DAILY
Status: DISCONTINUED | OUTPATIENT
Start: 2022-05-29 | End: 2022-05-30 | Stop reason: HOSPADM

## 2022-05-29 RX ORDER — POTASSIUM CHLORIDE 7.45 MG/ML
10 INJECTION INTRAVENOUS PRN
Status: DISCONTINUED | OUTPATIENT
Start: 2022-05-29 | End: 2022-05-30

## 2022-05-29 RX ADMIN — TRAZODONE HYDROCHLORIDE 50 MG: 50 TABLET ORAL at 22:11

## 2022-05-29 RX ADMIN — DIAZEPAM 5 MG: 5 TABLET ORAL at 05:07

## 2022-05-29 RX ADMIN — PANTOPRAZOLE SODIUM 40 MG: 40 TABLET, DELAYED RELEASE ORAL at 10:00

## 2022-05-29 RX ADMIN — LOSARTAN POTASSIUM 25 MG: 25 TABLET, FILM COATED ORAL at 22:11

## 2022-05-29 RX ADMIN — KETOROLAC TROMETHAMINE 30 MG: 30 INJECTION, SOLUTION INTRAMUSCULAR; INTRAVENOUS at 04:04

## 2022-05-29 RX ADMIN — KETOROLAC TROMETHAMINE 30 MG: 30 INJECTION, SOLUTION INTRAMUSCULAR at 04:04

## 2022-05-29 RX ADMIN — FINASTERIDE 5 MG: 5 TABLET, FILM COATED ORAL at 09:54

## 2022-05-29 RX ADMIN — LORAZEPAM 0.5 MG: 2 INJECTION INTRAMUSCULAR; INTRAVENOUS at 17:17

## 2022-05-29 RX ADMIN — ORPHENADRINE CITRATE 60 MG: 30 INJECTION INTRAMUSCULAR; INTRAVENOUS at 04:03

## 2022-05-29 RX ADMIN — LORAZEPAM 1 MG: 2 INJECTION INTRAMUSCULAR; INTRAVENOUS at 06:03

## 2022-05-29 RX ADMIN — CYCLOBENZAPRINE 5 MG: 10 TABLET, FILM COATED ORAL at 15:31

## 2022-05-29 RX ADMIN — KETOROLAC TROMETHAMINE 15 MG: 30 INJECTION, SOLUTION INTRAMUSCULAR at 19:00

## 2022-05-29 RX ADMIN — ALLOPURINOL 100 MG: 100 TABLET ORAL at 09:54

## 2022-05-29 RX ADMIN — AMLODIPINE BESYLATE 5 MG: 5 TABLET ORAL at 09:54

## 2022-05-29 ASSESSMENT — PAIN SCALES - GENERAL
PAINLEVEL_OUTOF10: 10
PAINLEVEL_OUTOF10: 0
PAINLEVEL_OUTOF10: 1
PAINLEVEL_OUTOF10: 0
PAINLEVEL_OUTOF10: 0

## 2022-05-29 ASSESSMENT — PAIN DESCRIPTION - LOCATION: LOCATION: NECK;SHOULDER

## 2022-05-29 ASSESSMENT — PAIN - FUNCTIONAL ASSESSMENT: PAIN_FUNCTIONAL_ASSESSMENT: NONE - DENIES PAIN

## 2022-05-29 NOTE — PROGRESS NOTES
Patient admitted to A2 room 210, patient oriented to call light, room phone, calling the kitchen, and plan of care for the day. Patient instructed on how to call for assistance. Tele box in place, bed locked in lowest postion, side rails up 2/4, call light in reach.  Will continue to monitor

## 2022-05-29 NOTE — ED PROVIDER NOTES
[facility]  ED PROVIDER NOTE    Patient Identification  Pt Name: Nida Hoyt  MRN: [id number]  Keke 5/13/1931  Date of evaluation: 5/29/2022  Provider: Melvin Rodrigez MD  PCP: Nilo Reece MD    Chief Complaint  Shoulder Pain (L sided, was seen earlier for same thing. states he is usally admitted for intravenou muscle relaxers) and Neck Pain      HPI  History provided by patient   This is a 80 y.o. male who presents to the ED for left-sided trapezius pain. Ongoing since Friday. Has had this 4 times in the past.  States that he was admitted once for it. No nausea or vomiting. No fevers or chills. No numbness or tingling. It is very positional.  It is spasm-like. He was here earlier yesterday. He attempted using muscle relaxers without improvement. .  No trauma. Has not been to the chiropractor. ROS  12 systems reviewed, pertinent positives/negatives per HPI otherwise noted to be negative. I have reviewed the following nursing documentation:  Allergies: Colestipol, Ezetimibe, and Statins    Past medical history:   Past Medical History:   Diagnosis Date    Asthma     Hyperlipidemia     Hypertension      Past surgical history:   Past Surgical History:   Procedure Laterality Date    APPENDECTOMY      CARPAL TUNNEL RELEASE      EYE SURGERY  6/28/12    Left Eye Cataract Removal    KNEE SURGERY      right knee    SHOULDER SURGERY      right    TONSILLECTOMY         Home medications:   Previous Medications    ALLOPURINOL (ZYLOPRIM) 100 MG TABLET    Take 100 mg by mouth daily    AZITHROMYCIN (ZITHROMAX) 250 MG TABLET    Take 2 tablets (500 mg) by mouth on  Day 1,  followed by 1 tablet (250 mg) by mouth once daily on Days 2 through 5. CYANOCOBALAMIN 1000 MCG TBCR    Take 1 tablet by mouth. FINASTERIDE PO    Take by mouth    FISH OIL-OMEGA-3 FATTY ACIDS 1000 MG CAPSULE    Take 2 g by mouth daily. Indications: stopped 1-0-8448    GARLIC 432 MG TABS    Take  by mouth. LIDOCAINE (LIDODERM) 5 %    Place 1 patch onto the skin daily 12 hours on, 12 hours off. METHOCARBAMOL (ROBAXIN) 500 MG TABLET    Take 1 tablet by mouth 4 times daily    METHOCARBAMOL (ROBAXIN) 500 MG TABLET    Take 1 tablet by mouth 4 times daily for 5 days    OMEPRAZOLE (PRILOSEC) 20 MG CAPSULE    Take 20 mg by mouth daily. RIVAROXABAN (XARELTO) 20 MG TABS TABLET    Take 20 mg by mouth       Social history:  reports that he has quit smoking. He does not have any smokeless tobacco history on file. He reports current alcohol use of about 1.0 standard drink of alcohol per week. He reports that he does not use drugs. Family history:  History reviewed. No pertinent family history. Exam  ED Triage Vitals [05/29/22 0306]   BP Temp Temp Source Heart Rate Resp SpO2 Height Weight   (!) 166/77 99 °F (37.2 °C) Oral 100 18 100 % -- --     Nursing note and vitals reviewed. Constitutional: In no acute distress  HENT:      Head: Normocephalic      Ears: External ears normal.      Nose: Nose normal.     Mouth: Membrane mucosa moist   Eyes: No discharge. Neck: Supple. Trachea midline. Cardiovascular: Regular rate. Warm extremities  Pulmonary/Chest: Effort normal. No respiratory distress. Abdominal: Soft. No distension. Nontender  : Deferred  Rectal: Deferred   Musculoskeletal: Moves all extremities. No gross deformity. Neurological: Alert and oriented. Face symmetric. Speech is clear. Skin: Warm and dry. Psychiatric: Normal mood and affect. Behavior is normal.    Procedures    Radiology  No orders to display       Labs  No results found for this visit on 05/29/22. Screenings   Jacqueline Coma Scale  Eye Opening: Spontaneous  Best Verbal Response: Oriented  Best Motor Response: Obeys commands  Jacqueline Coma Scale Score: 15       MDM and ED Course  This is a 80 y.o. male who presents to the ED for left sided trapezius pain. Likely secondary to torticollis given the spasming/positional nature of this. Highly doubt dissection given location the pain is not over the neck veins but actually the trapezius. Procedure  Trigger point injection  Indication neck pain  21-gauge needle used. Area cleansed thoroughly with alcohol wipe and allowed to dry. 1% lidocaine without epinephrine 1 cc injected into 5 different locations on patient's upper left trapezius. No complications. Benefits and risks including damage to deeper structures and infection were discussed with the patient    Patient still has pain despite muscle relaxers, Valium, trigger point injection, Toradol. Will give additional benzos. --------    On reassessment, patient still has pain on moving his head. admitted to hospitalist       [unfilled]    Is this patient to be included in the SEP-1 Core Measure due to severe sepsis or septic shock? No   Exclusion criteria - the patient is NOT to be included for SEP-1 Core Measure due to: Infection is not suspected        Final Impression  1. Torticollis        Blood pressure 126/68, pulse 80, temperature 99 °F (37.2 °C), temperature source Oral, resp. rate 18, height 5' 10\" (1.778 m), weight 185 lb (83.9 kg), SpO2 97 %. Disposition:  DISPOSITION        Patient Referrals:  No follow-up provider specified. Discharge Medications:  New Prescriptions    No medications on file       Discontinued Medications:  Discontinued Medications    No medications on file       This chart was generated using the [address] [place] [address] [address] [employer] system. I created this record but it may contain dictation errors given the limitations of this technology.         Tony Lucas MD  05/29/22 6657

## 2022-05-29 NOTE — H&P
Hospital Medicine History & Physical      PCP: Hodan Leggett MD    Date of Admission: 5/29/2022    Date of Service: Pt seen/examined on 5/29/2022 and Placed in Observation. Chief Complaint:    Neck pain    History Of Present Illness:    80 y.o. male who presented to [facility] with Torticollis. Patient with history of aortic stenosis. He is scheduled to have surgery on June 1, 2022 at [facility].  States he is to hold his Plavix on Monday for a procedure on Tuesday. He states he has had 4 episodes of torticollis. His last episode was last year in [place]. He states he has been treated with IV muscle relaxants which have helped with his problems. Patient was here yesterday in the ED and was given oral muscle relaxants. He states he did not get any relief from the medication. He states oral muscle relaxants and benzos do not help initially. Patient denies any chest pain. No shortness of breath. No nausea or vomiting. He states he is compliant with his medications. Past Medical History:          Diagnosis Date    Asthma     Hyperlipidemia     Hypertension    Aortic stenosis  Torticollis    Past Surgical History:          Procedure Laterality Date    APPENDECTOMY      CARPAL TUNNEL RELEASE      EYE SURGERY  6/28/12    Left Eye Cataract Removal    KNEE SURGERY      right knee    SHOULDER SURGERY      right    TONSILLECTOMY         Medications Prior to Admission:      Prior to Admission medications    Medication Sig Start Date End Date Taking?  Authorizing Provider   methocarbamol (ROBAXIN) 500 MG tablet Take 1 tablet by mouth 4 times daily for 5 days 5/28/22 6/2/22  JACINTO Colindres CNP   rivaroxaban (XARELTO) 20 MG TABS tablet Take 20 mg by mouth    Historical Provider, MD   FINASTERIDE PO Take by mouth    Historical Provider, MD   methocarbamol (ROBAXIN) 500 MG tablet Take 1 tablet by mouth 4 times daily 9/8/17   JACINTO Ruffin CNP   lidocaine (LIDODERM) 5 % Place 1 patch onto the skin daily 12 hours on, 12 hours off. 9/8/17   JACINTO Perdomo - CNP   allopurinol (ZYLOPRIM) 100 MG tablet Take 100 mg by mouth daily    Historical Provider, MD   azithromycin (ZITHROMAX) 250 MG tablet Take 2 tablets (500 mg) by mouth on  Day 1,  followed by 1 tablet (250 mg) by mouth once daily on Days 2 through 5. 5/24/16   Frankie Arevalo MD   omeprazole (PRILOSEC) 20 MG capsule Take 20 mg by mouth daily. Historical Provider, MD   fish oil-omega-3 fatty acids 1000 MG capsule Take 2 g by mouth daily. Indications: stopped 6-1-2012    Historical Provider, MD   Garlic 587 MG TABS Take  by mouth. Historical Provider, MD   Cyanocobalamin 1000 MCG TBCR Take 1 tablet by mouth. Historical Provider, MD       Allergies:  Colestipol, Ezetimibe, and Statins    Social History:      The patient currently lives at home    TOBACCO:   reports that he has quit smoking. He does not have any smokeless tobacco history on file. ETOH:   reports current alcohol use of about 1.0 standard drink of alcohol per week. E-Cigarettes/Vaping Use     Questions Responses    E-Cigarette/Vaping Use     Start Date     Passive Exposure     Quit Date     Counseling Given     Comments             Family History:      Reviewed in detail and negative for DM, CAD, Cancer, CVA. Positive as follows:    History reviewed. No pertinent family history. REVIEW OF SYSTEMS COMPLETED:   Pertinent positives as noted in the HPI. All other systems reviewed and negative. PHYSICAL EXAM PERFORMED:    /79   Pulse 89   Temp 99 °F (37.2 °C) (Oral)   Resp 16   Ht 5' 10\" (1.778 m)   Wt 185 lb (83.9 kg)   SpO2 92%   BMI 26.54 kg/m²     General appearance: Mild discomfort, appears younger than stated age and cooperative. HEENT:  Normal cephalic, atraumatic without obvious deformity. Pupils equal, round, and reactive to light. Extra ocular muscles intact. Conjunctivae/corneas clear.   Neck: Limited range of motion due to pain and stiffness. Tenderness along his trapezius muscle on the left side. No jugular venous distention. Trachea midline. Respiratory:  Normal respiratory effort. Clear to auscultation, bilaterally without Rales/Wheezes/Rhonchi. Cardiovascular:  Regular rate and rhythm with normal S1/S2 without murmurs, rubs or gallops. Abdomen: Soft, non-tender, non-distended with normal bowel sounds. Musculoskeletal:  No clubbing, cyanosis or edema bilaterally. Full range of motion without deformity. Skin: Skin color, texture, turgor normal.  No rashes or lesions. Neurologic:  Neurovascularly intact without any focal sensory/motor deficits. Cranial nerves: II-XII intact, grossly non-focal.  Psychiatric:  Alert and oriented, thought content appropriate, normal insight  Capillary Refill: Brisk,3 seconds, normal  Peripheral Pulses: +2 palpable, equal bilaterally       Labs:     Recent Labs     05/29/22  0554   WBC 10.7   HGB 13.6   HCT 39.9*        Recent Labs     05/29/22  0554   *   K 3.7      CO2 24   BUN 36*   CREATININE 1.6*   CALCIUM 8.9     No results for input(s): AST, ALT, BILIDIR, BILITOT, ALKPHOS in the last 72 hours. No results for input(s): INR in the last 72 hours. No results for input(s): Verlena Kingsley in the last 72 hours.     Urinalysis:    No results found for: Melva Jeronimo [facility] [address], [address] [place] [address], [facility] [address]    Radiology:     CXR: I have reviewed the CXR with the following interpretation: Not done  EKG:  I have reviewed the EKG with the following interpretation: LVH, sinus rhythm    No orders to display       ASSESSMENT:    Active Hospital Problems    Diagnosis Date Noted    Severe aortic stenosis [I35.0] 05/29/2022     Priority: Medium    Left carotid artery occlusion [I65.22] 05/29/2022     Priority: Medium    CAD S/P percutaneous coronary angioplasty [I25.10, Z98.61] 05/29/2022     Priority: Medium    Asthma-COPD overlap syndrome ([facility] [J44.9] 05/29/2022     Priority: Medium    Stage 3a chronic kidney disease ([facility] [N18.31] 05/29/2022     Priority: Medium    Torticollis [M43.6] 05/29/2022     Priority: Medium         PLAN:    1. Torticollis. This may be due to muscle strain. We will try topical lidocaine patch. IV Ativan and p.o. Flexeril will be ordered. Will monitor for excessive sedation. PT OT will be ordered. CT of the neck reviewed no acute abnormality noted. 2.  Aortic stenosis. Patient scheduled to have surgery on Wednesday. Will give Plavix today and then hold tomorrow. Continue telemetry. 3. Hyperlipidemia - Allergies to most antilipid meds. He is on fish oil. Can resume as outpatient  4. Hypertension - BP controlled. Continue amlodipine and ARB. DVT Prophylaxis: Lovenox  Diet: ADULT DIET; Regular; Low Fat/Low Chol/High Fiber/JUDITH  Code Status: Full Code    PT/OT Eval Status: Pending    Dispo -home when stable possibly in the a.m. Clint Mercer MD    Thank you Dillon Guerrero MD for the opportunity to be involved in this patient's care. If you have any questions or concerns please feel free to contact me at [phone].

## 2022-05-29 NOTE — ED NOTES
Pt feeling better, resting comfortably, awakens to verbal easily, Sharita whitaker, RN  05/29/22 5287

## 2022-05-29 NOTE — ED NOTES
Report given to Vick Lee from [address] Aneesh Hough, RN  05/29/22 8045 [address] Yahir, RN  05/29/22 2305

## 2022-05-29 NOTE — PROGRESS NOTES
Physical Therapy  Orders received and chart reviewed. Attempted to see pt at this time for PT evaluation. Pt currently resting in bed reports increased pain and muscular spasm currently after yawning. RN providing muscle relaxer. Pt requesting to hold PT until next date d/t pain. Educated in gentle neck ROM and upper trap stretching. Will re-attempt formal evaluation as schedule permits. Thanks.   Xander Marquez PT, DPT

## 2022-05-29 NOTE — PROGRESS NOTES
Occupational Therapy  Facility/Department: [facility] A2 CARD TELEMETRY  Occupational Therapy Initial Assessment, Tx, and DC    Name: Daniella Weber  : 1931  MRN: [id number]  Date of Service: 2022    Discharge Recommendations:  24 hour supervision or assist  OT Equipment Recommendations  Equipment Needed: No       Patient Diagnosis(es): The encounter diagnosis was Torticollis. Past Medical History:  has a past medical history of Asthma, Hyperlipidemia, and Hypertension. Past Surgical History:  has a past surgical history that includes Appendectomy; Tonsillectomy; Carpal tunnel release; knee surgery; shoulder surgery; and eye surgery (12). Assessment   Assessment: Pt is 80 y.o male who presents to [facility] with muscle spasms in LUE. Pt presents functioning close to baseline and completes all functional transfers, functional mobility, and ADLs with supvn. Pt reports no concerns for returning home and that he feels close to his baseline. No acute OT need identified, will d.c. services. Please re-consult if a need arises.   Prognosis: Good  Decision Making: Low Complexity  No Skilled OT: At baseline function  Activity Tolerance  Activity Tolerance: Patient Tolerated treatment well  Activity Tolerance Comments: VSS throughout session        Plan   Plan  Times per Week: 1x visit only  Current Treatment Recommendations: Strengthening,Balance training,Functional mobility training,Endurance training,Patient/Caregiver education & training,Safety education & training,Equipment evaluation, education, & procurement,Self-Care / ADL     Restrictions  Position Activity Restriction  Other position/activity restrictions: up with assist    Subjective   General  Chart Reviewed: Yes  Patient assessed for rehabilitation services?: Yes  Family / Caregiver Present: No  Referring Practitioner: Jearldine Hashimoto, MD  Diagnosis: Torticolis; L UE/cervical muscle spasms  Subjective  Subjective: Pt agreeable to OT eval and tx. \"Well I drove here. .I'd say I am pretty close to my baseline\"  General Comment  Comments: RN clears for therapy  Pain: Pt rates pain at rest 2/10 in L cervical/shoulder region. Pt when moving states he gets spasms that are 10/10 painful. Social/Functional History  Social/Functional History  Lives With: Spouse  Type of Home: Apartment  Home Layout: One level  Home Access: Level entry  Bathroom Shower/Tub: Walk-in shower  Bathroom Toilet: Handicap height  Bathroom Equipment: Shower chair,Grab bars in shower,Grab bars around toilet  Home Equipment: Cane,Walker, 4 wheeled  Has the patient had two or more falls in the past year or any fall with injury in the past year?: No  Receives Help From: Family  ADL Assistance: Independent  Homemaking Assistance: Independent  Ambulation Assistance: Independent  Transfer Assistance: Independent  Active : Yes  Leisure & Hobbies: golf, fishing       Objective   Heart Rate: 66  Heart Rate Source: Monitor  BP: (!) 141/70  BP Location: Right upper arm  BP Method: Automatic  Patient Position: Semi fowlers  MAP (Calculated): 93.67  Resp: 18  SpO2: 95 %  O2 Device: None (Room air)  Comment: spO2 and HR spot checked throughout and remained [facility]  Vision Exceptions: Wears glasses at all times  Hearing: Exceptions to [facility]  Hearing Exceptions: Bilateral hearing aid;Hard of hearing/hearing concerns          Safety Devices  Type of Devices: Call light within reach; Chair alarm in place;Nurse notified; Left in chair  Restraints  Restraints Initially in Place: No  Bed Mobility Training  Bed Mobility Training: Yes  Overall Level of Assistance: Stand-by assistance  Interventions: Verbal cues; Safety awareness training  Supine to Sit: Supervision (HOB elevated, increased time to complete)  Sit to Supine:  (SAMANTHA - in chair at end of session)  Balance  Sitting: Intact  Sitting - Static: Good (unsupported); Normal;Unsupported  Sitting - Dynamic: Good (unsupported); Normal;Unsupported  Standing: GOAL MET 5/29  Short Term Goal 2: Pt will complete LB dressing supvn or less -GOAL SANTOS 5/29  Patient Goals   Patient goals : \"to go home and be pain free\"       Therapy Time   Individual Concurrent Group Co-treatment   Time In 1242         Time Out 1315         Minutes 33         Timed Code Treatment Minutes: 23 Minutes (+ 10 min eval)       Shamir Andujar

## 2022-05-29 NOTE — ED NOTES
Pt arrives from home for complaints of continued neck/shoulder pain. Pt states he was seen here earlier in the day and was discharged. Pt states he is \"usually admitted for intravenous muscle relaxers\". Pt states the pills do nothing for him. Pt alert and oriented. Pt ambulatory with steady gait and without use of assistive devices. Pt denies any lightheaded or dizziness.       Yael White RN  05/29/22 9767

## 2022-05-30 VITALS
TEMPERATURE: 97.9 F | BODY MASS INDEX: 27.54 KG/M2 | DIASTOLIC BLOOD PRESSURE: 55 MMHG | RESPIRATION RATE: 18 BRPM | HEIGHT: 70 IN | SYSTOLIC BLOOD PRESSURE: 126 MMHG | WEIGHT: 192.4 LBS | HEART RATE: 61 BPM | OXYGEN SATURATION: 96 %

## 2022-05-30 PROCEDURE — 6370000000 HC RX 637 (ALT 250 FOR IP): Performed by: INTERNAL MEDICINE

## 2022-05-30 PROCEDURE — 97530 THERAPEUTIC ACTIVITIES: CPT

## 2022-05-30 PROCEDURE — 97161 PT EVAL LOW COMPLEX 20 MIN: CPT

## 2022-05-30 PROCEDURE — 6370000000 HC RX 637 (ALT 250 FOR IP)

## 2022-05-30 PROCEDURE — G0378 HOSPITAL OBSERVATION PER HR: HCPCS

## 2022-05-30 RX ORDER — CYCLOBENZAPRINE HCL 5 MG
5 TABLET ORAL 3 TIMES DAILY PRN
Qty: 15 TABLET | Refills: 0 | Status: SHIPPED | OUTPATIENT
Start: 2022-05-30 | End: 2022-06-04

## 2022-05-30 RX ADMIN — FINASTERIDE 5 MG: 5 TABLET, FILM COATED ORAL at 09:00

## 2022-05-30 RX ADMIN — CYCLOBENZAPRINE 5 MG: 10 TABLET, FILM COATED ORAL at 08:59

## 2022-05-30 RX ADMIN — ALLOPURINOL 100 MG: 100 TABLET ORAL at 08:59

## 2022-05-30 RX ADMIN — PANTOPRAZOLE SODIUM 40 MG: 40 TABLET, DELAYED RELEASE ORAL at 05:53

## 2022-05-30 RX ADMIN — AMLODIPINE BESYLATE 5 MG: 5 TABLET ORAL at 08:59

## 2022-05-30 RX ADMIN — ACETAMINOPHEN 650 MG: 325 TABLET ORAL at 08:59

## 2022-05-30 ASSESSMENT — PAIN SCALES - GENERAL
PAINLEVEL_OUTOF10: 3
PAINLEVEL_OUTOF10: 0
PAINLEVEL_OUTOF10: 0

## 2022-05-30 NOTE — PLAN OF CARE
Problem: Pain  Goal: Verbalizes/displays adequate comfort level or baseline comfort level  5/30/2022 1200 by Fatmata Fabian RN  Outcome: Progressing  Note: Pt will be satisfied with pain control. Pt uses numeric pain rating scale with reassessments after pain med administration. Will continue to monitor progression throughout shift. Problem: ABCDS Injury Assessment  Goal: Absence of physical injury  Outcome: Progressing  Note: Pt will remain free from falls throughout hospital stay. Fall precautions in place, bed alarm on, bed in lowest position with wheels locked and side rails 2/4 up. Room door open and hourly rounding completed. Will continue to monitor throughout shift.

## 2022-05-30 NOTE — DISCHARGE SUMMARY
Hospital Medicine Discharge Summary    Patient ID: Dasia Hernandez      Patient's PCP: Kehinde Melendrez MD    Admit Date: 5/29/2022     Discharge Date:   5/30/2022    Admitting Provider: Quita Rangel MD     Discharge Provider: JACINTO Finney CNP     Discharge Diagnoses: Active Hospital Problems    Diagnosis     Severe aortic stenosis [I35.0]      Priority: Medium    Left carotid artery occlusion [I65.22]      Priority: Medium    CAD S/P percutaneous coronary angioplasty [I25.10, Z98.61]      Priority: Medium    Asthma-COPD overlap syndrome (HCC) [J44.9]      Priority: Medium    Stage 3a chronic kidney disease ([facility] [address] [N18.31]      Priority: Medium    Torticollis [M43.6]      Priority: Medium       The patient was seen and examined on day of discharge and this discharge summary is in conjunction with any daily progress note from day of discharge. Hospital Course:  80 y. o. male who presented to [facility] with Torticollis. Pt with history of aortic stenosis. He is scheduled to have surgery on June 1, 2022 at [address] [place] [address]. States he is to hold his Plavix on Monday for a procedure on Tuesday. He states he has had 4 episodes of torticollis. His last episode was last year in [place]. He states he has been treated with IV muscle relaxants which have helped with his problems. Pt was here yesterday in the ED and was given oral muscle relaxants. He states he did not get any relief from the medication.  He states oral muscle relaxants and benzos do not help initially. Pt denies any chest pain. No shortness of breath. No nausea or vomiting. He states he is compliant with his medications. He received a dose of IV norflex while in the ED. Torticollis (clinically improved):   - This may be due to muscle strain. CT of the neck reviewed no acute abnormality noted. Continue topical lidocaine patch as well as tylenol and flexeril for pain relief.  Avoid NSAIDs given upcoming surgery, CKD and Xarelto use. PT/OT evaluations with recs for outpt PT. Aortic stenosis:  - Pt scheduled to have surgery this Wednesday at [facility]. Hyperlipidemia   - Allergies to most antilipid meds. He is on fish oil. Can resume as outpatient. Hypertension:  - BP controlled. Continue amlodipine and ARB. Chronic kidney disease:  - Stable. Baseline Cr ~1.4. Currently 1.6. Avoid nephrotoxic exposure where possible. Physical Exam Performed:     BP (!) 126/55   Pulse 61   Temp 97.9 °F (36.6 °C)   Resp 18   Ht 5' 10\" (1.778 m)   Wt 192 lb 6.4 oz (87.3 kg)   SpO2 96%   BMI 27.61 kg/m²       General appearance:  Elderly male in no apparent distress, appears stated age and cooperative. HEENT:  Normal cephalic, atraumatic without obvious deformity. Pupils equal, round, and reactive to light. Extra ocular muscles intact. Conjunctivae/corneas clear. Neck: Supple, with full range of motion. No jugular venous distention. Trachea midline. Respiratory:  Normal respiratory effort. Clear to auscultation, bilaterally without Rales/Wheezes/Rhonchi. Cardiovascular:  Regular rate and rhythm with normal S1/S2, +murmur. Abdomen: Soft, non-tender, non-distended with normal bowel sounds. Musculoskeletal:  No clubbing, cyanosis or edema bilaterally. Full range of motion without deformity. Skin: Skin color, texture, turgor normal.  No rashes or lesions. Neurologic:  Neurovascularly intact without any focal sensory/motor deficits. Cranial nerves: II-XII intact, grossly non-focal.  Psychiatric:  Alert and oriented, thought content appropriate, normal insight  Capillary Refill: Brisk,< 3 seconds   Peripheral Pulses: +2 palpable, equal bilaterally       Labs:  For convenience and continuity at follow-up the following most recent labs are provided:      CBC:    Lab Results   Component Value Date    WBC 10.7 05/29/2022    HGB 13.6 05/29/2022    HCT 39.9 05/29/2022     05/29/2022       Renal:    Lab Results   Component

## 2022-05-30 NOTE — PROGRESS NOTES
Physical Therapy  Facility/Department: [facility] A2 CARD TELEMETRY  Physical Therapy Initial Assessment, Treatment and Discharge Summary    Name: Saeid Freitas  : 1931  MRN: [id number]  Date of Service: 2022    Discharge Recommendations:  Home independently,Outpatient PT   PT Equipment Recommendations  Equipment Needed: No      Patient Diagnosis(es): The encounter diagnosis was Torticollis. Past Medical History:  has a past medical history of Asthma, Hyperlipidemia, and Hypertension. Past Surgical History:  has a past surgical history that includes Appendectomy; Tonsillectomy; Carpal tunnel release; knee surgery; shoulder surgery; and eye surgery (12). Assessment   Body Structures, Functions, Activity Limitations Requiring Skilled Therapeutic Intervention: Increased pain;Decreased posture  Assessment: Patient is a 80year old male who presented to [facility] on 22 with torticollis, COPD, CAD. Patient is normally independent with functional mobility. Patient has returned to his prior independent level of function. Today he ambulated 150 feet with modified independence. Patient said that his neck pain has improved to a 2/10. Patient declined completing cervical ROM/stretching exercises today to help address his neck pain/stiffness. If patient's neck pain returns then the patient may benefit from outpatient PT to maximize his cervical range of motion and help manage his neck pain. Patient is safe for home, when medically stable. PT signing off. Treatment Diagnosis: decreased independence with functional mobility. Specific Instructions for Next Treatment: N/A PT signing off  Therapy Prognosis: Excellent  Decision Making: Low Complexity  Barriers to Learning: none  No Skilled PT: Independent with functional mobility   Requires PT Follow-Up: No  Activity Tolerance  Activity Tolerance: Patient tolerated treatment well  Activity Tolerance Comments: Vitals: 97% on room air.  72 BPM. 136/52 post activity: 95% on room air 86 BPM.     Plan   Plan  Plan: Discharge  Plan weeks: N/A PT signing off  Specific Instructions for Next Treatment: N/A PT signing off  Safety Devices  Type of Devices: All fall risk precautions in place,Patient at risk for falls,Gait belt,Bed alarm in place,Left in bed,Nurse notified  Restraints  Restraints Initially in Place: No     Restrictions  Restrictions/Precautions  Restrictions/Precautions: Fall Risk,General Precautions  Position Activity Restriction  Other position/activity restrictions: up with assist     Subjective   Pain: Pt rates pain at rest 2/10 in L cervical/shoulder region. General  Chart Reviewed: Yes  Patient assessed for rehabilitation services?: Yes  Additional Pertinent Hx: HPI per chart, \"80 y.o. male who presented to [facility] with Torticollis. Patient with history of aortic stenosis. He is scheduled to have surgery on June 1, 2022 at [facility].  States he is to hold his Plavix on Monday for a procedure on Tuesday. He states he has had 4 episodes of torticollis. His last episode was last year in [place]. He states he has been treated with IV muscle relaxants which have helped with his problems. Patient was here yesterday in the ED and was given oral muscle relaxants. He states he did not get any relief from the medication. \"  Response To Previous Treatment: Not applicable  Family / Caregiver Present: No  Referring Practitioner: Romero Page MD  Referral Date : 05/29/22  Follows Commands: Within Functional Limits  General Comment  Comments: Supine in bed upon entry of therapy staff. Cleared for therapy by RN. Subjective  Subjective: Patient agreed to participate.          Social/Functional History  Social/Functional History  Lives With: Spouse  Type of Home: Apartment  Home Layout: One level  Home Access: Level entry  Bathroom Shower/Tub: Walk-in shower  Bathroom Toilet: Handicap height  Bathroom Equipment: Shower chair,Grab bars in shower,Grab bars around toilet  Home Equipment: Cane,Walker, 4 wheeled  Has the patient had two or more falls in the past year or any fall with injury in the past year?: No  Receives Help From: Family  ADL Assistance: Independent  Homemaking Assistance: Independent  Ambulation Assistance: Independent (without assistive device)  Transfer Assistance: Independent  Active : Yes  Occupation: Retired  Type of Occupation:   Leisure & Hobbies: golf, fishing  Vision/Hearing  Vision Exceptions: Wears glasses at all times  Hearing: Exceptions to [address] Exceptions: Bilateral hearing aid;Hard of hearing/hearing concerns    Cognition   Orientation  Overall Orientation Status: Within Functional Limits  Orientation Level: Oriented X4  Cognition  Overall Cognitive Status: WFL     Objective   Heart Rate: 71  Heart Rate Source: Monitor  BP: (!) 152/67  BP Location: Right upper arm  BP Method: Automatic  Patient Position: Semi fowlers  MAP (Calculated): 95.33  Resp: 16  SpO2: 93 %  O2 Device: None (Room air)     Observation/Palpation  Posture: Fair  Gross Assessment  AROM: Within functional limits  PROM: Within functional limits  Strength: Within functional limits  Sensation: Intact                    Bed mobility  Supine to Sit: Modified independent  Sit to Supine: Modified independent  Bed Mobility Comments: head of bed elevated  Transfers  Sit to Stand: Modified independent  Stand to sit: Modified independent  Bed to Chair: Modified independent  Ambulation  Surface: level tile  Device: No Device  Assistance: Modified Independent  Quality of Gait: forward flexed posture, decreased gait velocity. otherwise WFL  Gait Deviations: Slow Tali;Decreased step length;Decreased step height  Distance: 150 feet  Comments: No dizziness, chest pain. he did have some SOB, but patient said that this is chronic and his O2 saturation was 95% or greater on room air.   More Ambulation?: No  Stairs/Curb  Stairs?: No (patient said that he does not have stairs at home)     Balance  Posture: Fair  Sitting - Static: Good  Sitting - Dynamic: Good  Standing - Static: Good  Standing - Dynamic: Good  Comments: no device. Exercise Treatment: Patient declined completing upper trapezius/stretches for his torticollis. \"I just do not want to aggrevate it right now. \" Patient provided some education on stretching but patient again refused. AM-PAC Score     AM-PAC Inpatient Mobility without Stair Climbing Raw Score : 20 (05/30/22 1136)  AM-PAC Inpatient without Stair Climbing T-Scale Score : 60.57 (05/30/22 1136)  Mobility Inpatient CMS 0-100% Score: 0 (05/30/22 1136)  Mobility Inpatient without Stair CMS G-Code Modifier : [address] (05/30/22 1136)       Goals  Short Term Goals  Time Frame for Short term goals: 1 session 5/30/22  Short term goal 1: Patient will be independent with bed mobility, transfers. Goal met 5/30  Short term goal 2: Patient will ambulate 150 feet with modified independence. Goal met 5/30  Patient Goals   Patient goals : To go home. Education  Patient Education  Education Given To: Patient; Family  Education Provided: Role of Therapy;Plan of Care;Transfer Training  Education Provided Comments: educated patient on benefits of increased mobility, safety with mobility, use of call bell. Education Method: Demonstration;Verbal  Barriers to Learning: None  Education Outcome: Verbalized understanding; Other (Comment) (patient refused to complete cervical stretches.  otherwise he was agreeable to education)      Therapy Time   Individual Concurrent Group Co-treatment   Time In 1032         Time Out 1054         Minutes 22         Timed Code Treatment Minutes: 12 Minutes (10 minute evaluation)       [place] Sanchez, PT

## 2022-08-09 ENCOUNTER — HOSPITAL ENCOUNTER (OUTPATIENT)
Dept: CARDIAC REHAB | Age: 87
Setting detail: THERAPIES SERIES
Discharge: HOME | End: 2022-08-09
Payer: MEDICARE

## 2022-08-09 VITALS — HEIGHT: 70 IN | OXYGEN SATURATION: 96 % | BODY MASS INDEX: 26.92 KG/M2 | RESPIRATION RATE: 16 BRPM | WEIGHT: 188 LBS

## 2022-08-09 RX ORDER — TRAZODONE HYDROCHLORIDE 50 MG/1
TABLET ORAL
COMMUNITY
Start: 2022-08-05

## 2022-08-09 RX ORDER — FLUTICASONE FUROATE, UMECLIDINIUM BROMIDE AND VILANTEROL TRIFENATATE 100; 62.5; 25 UG/1; UG/1; UG/1
POWDER RESPIRATORY (INHALATION)
COMMUNITY
Start: 2021-10-29

## 2022-08-09 RX ORDER — CLOPIDOGREL BISULFATE 75 MG/1
TABLET ORAL
COMMUNITY
Start: 2022-05-16 | End: 2022-09-30

## 2022-08-09 RX ORDER — CALCIUM CARBONATE/VITAMIN D3 500-10/5ML
400 LIQUID (ML) ORAL DAILY
COMMUNITY
Start: 2021-09-24

## 2022-08-09 RX ORDER — IRBESARTAN 75 MG/1
75 TABLET ORAL NIGHTLY
COMMUNITY
Start: 2022-07-05

## 2022-08-09 RX ORDER — FINASTERIDE 5 MG/1
TABLET, FILM COATED ORAL
COMMUNITY
Start: 2022-05-18

## 2022-08-09 RX ORDER — AMLODIPINE BESYLATE 5 MG/1
TABLET ORAL
COMMUNITY
Start: 2022-07-29

## 2022-08-09 ASSESSMENT — PATIENT HEALTH QUESTIONNAIRE - PHQ9
3. TROUBLE FALLING OR STAYING ASLEEP: 0
1. LITTLE INTEREST OR PLEASURE IN DOING THINGS: 0
2. FEELING DOWN, DEPRESSED OR HOPELESS: 0
SUM OF ALL RESPONSES TO PHQ QUESTIONS 1-9: 1
9. THOUGHTS THAT YOU WOULD BE BETTER OFF DEAD, OR OF HURTING YOURSELF: 0
4. FEELING TIRED OR HAVING LITTLE ENERGY: 1
8. MOVING OR SPEAKING SO SLOWLY THAT OTHER PEOPLE COULD HAVE NOTICED. OR THE OPPOSITE, BEING SO FIGETY OR RESTLESS THAT YOU HAVE BEEN MOVING AROUND A LOT MORE THAN USUAL: 0
7. TROUBLE CONCENTRATING ON THINGS, SUCH AS READING THE NEWSPAPER OR WATCHING TELEVISION: 0
SUM OF ALL RESPONSES TO PHQ QUESTIONS 1-9: 1
10. IF YOU CHECKED OFF ANY PROBLEMS, HOW DIFFICULT HAVE THESE PROBLEMS MADE IT FOR YOU TO DO YOUR WORK, TAKE CARE OF THINGS AT HOME, OR GET ALONG WITH OTHER PEOPLE: 0
6. FEELING BAD ABOUT YOURSELF - OR THAT YOU ARE A FAILURE OR HAVE LET YOURSELF OR YOUR FAMILY DOWN: 0
SUM OF ALL RESPONSES TO PHQ QUESTIONS 1-9: 1
SUM OF ALL RESPONSES TO PHQ QUESTIONS 1-9: 1
5. POOR APPETITE OR OVEREATING: 0
SUM OF ALL RESPONSES TO PHQ9 QUESTIONS 1 & 2: 0

## 2022-08-09 ASSESSMENT — EXERCISE STRESS TEST
PEAK_HR: 98
PEAK_BP: 164/60
PEAK_RPE: 12

## 2022-08-09 NOTE — PLAN OF CARE
Individual Treatment Plan  Cardiac Rehabilitation    Initial Assessment  Name: Louisa Jacobo Admit to Rehab: 2022   : 1931 Primary Diagnosis: DANIEL    Age: 80 y.o. Referring Physician:  Josh Lesteve   MRN: [id number] Primary Care Physician: Daryl Hoff MD     Allergies   Allergen Reactions    Ezetimibe Other (See Comments)     aches  aches    aches    Colestipol      aches    Statins Other (See Comments)     ALSO LIPITOR, VYTORIN       @EXTERNALTESTID(lipid)@   Exercise Assessment  Stages of Change: Preparation   Risk Stratification: Medium    Fall Risk: Low  Assistive Devices:None    Initial Assessment: 6 Minute Walk Test   Pre-Test Vitals: Data Measured Before Walk  Heart Rate: 75  Blood Pressure: 140/60  O2 Saturation: 96  O2 Device: Room air;       Peak Vitals: Data Measured Immediately After Walk  Distance Walked (ft): 1150 ft  Peak Heart Rate: 98  Peak Blood Pressure: 164/60  O2 Saturation: 96        During: Data Measured during Walk  Indicate Mode of RPE: 6 minutes  Any problems while exercising: Denies  Do You Have Shortness of Breath: No  Describe Type of Pain You Are Having: Mild pain in left foot  Peak RPE: 12   Number of Rest: 0    Post-Test Vitals: Data Measured at 5 Minutes After Walk  Heart Rate: 63  Blood Pressure: 128/60  SpO2: 96 %  O2 Device: Room air    Key Anti-Hypertensive Meds            amLODIPine (NORVASC) 5 MG tablet (Taking)    Class: Historical Med    irbesartan (AVAPRO) 75 MG tablet (Taking)    Class: Historical Med             Exercise Prescription        Mode: . Treadmill, Bike (Upright or recumbent), NuStep, Rower, SciFit, Arm Ergometer, Recumbent Eliptical, and Walking      Frequency: 2-3 days/week supervised      Intensity:RPE 11-14      Target Heart Rate: Resting HR + 20-30      Duration: 20-30+ minutes/day      Resistance Training: Yes, 3 days per week      Progression:Increase exercise by 0.5 METs every 1 to 2 weeks to goal of 4 to 6 METs      Supplemental oxygen:No    Plan  Home Exercise: Yes  Mode:Walking  Resistance Training:Yes    Target Goals  Adhere to cardiac exercise guidelines , Increase exercise endurance and stamina , Increase functional capacity as compared to initial assessment , and Participate in strength training   Education  Mr. Ada Vang was educated on the importance of   self pulse check, warm up and cool down, signs and symptoms to report, exercise safety, RPE scale, Importance of physical activity and exercise progression, and equipment orientation    Nutrition Assessment  Stages of Change: Action  Height: 5' 10\" (177.8 cm) Weight: 188 lb (85.3 kg) BMI (Calculated): 27     Nutrition Survey: Rate Your Plate Score: Rate Your Plate Total Score: 57     Patient Weight Goal: 170lbs   Recommended Diet:low fat, low cholesterol, substitute healthy fats, such as olive oil, canola oil, grapeseed oil for saturated fats like butter, margarine, and shortening, minimize processed foods, reduce sodium intake, minimize simple sugars, and increase fiber intake  Diabetic:No  Key Antihyperglycemic Medications       Patient is on no antihyperglycemic meds. Key Hyperlipidemia Meds       The patient is on no antihyperlipidemia meds.                  Lab Results   Component Value Date    GLUCOSE 152 (H) 05/29/2022          Nutrition Intervention  Clinician/Patient discussion, Complete diet survey, and Participate in an exercise program that promotes weight loss    Target Goals  Articulate heart healthy diet , Complete cardiac diet classes , Control blood pressure , and Reduce weight     Education  Mr. Ada Vang was educated on the importance offluid intake, daily weight monitoring, and portion control    Psychosocial Assessment  Stages of Change:Contemplation  Social History     Socioeconomic History    Marital status:      Spouse name: Not on file    Number of children: Not on file    Years of education: Not on file    Highest education level: Not on file Occupational History     Employer: RETIRED   Tobacco Use    Smoking status: Former     Packs/day: 1.00     Years: 15.00     Pack years: 15.00     Types: Cigarettes     Quit date: 1958     Years since quittin.3    Smokeless tobacco: Never   Vaping Use    Vaping Use: Never used   Substance and Sexual Activity    Alcohol use: Yes     Alcohol/week: 1.0 standard drink     Types: 1 Cans of beer per week    Drug use: No    Sexual activity: Not on file   Other Topics Concern    Not on file   Social History Narrative    Not on file     Social Determinants of Health     Financial Resource Strain: Not on file   Food Insecurity: Not on file   Transportation Needs: Not on file   Physical Activity: Not on file   Stress: Not on file   Social Connections: Not on file   Intimate Partner Violence: Not on file   Housing Stability: Not on file      Estimated return to work date: na    Psychosocial Test  Tool Used:  Today's PHQ:    PHQ Scores 2022   PHQ2 Score 0   PHQ9 Score 1     Interpretation of Total Score Depression Severity: 1-4 = Minimal depression, 5-9 = Mild depression, 10-14 = Moderate depression, 15-19 = Moderately severe depression, 20-27 = Severe depression    Reports psychosocial symptoms:No  Currently on medication therapy:No  Currently seeing a mental health professional:No  Positive support system:Yes    Psychosocial Intervention  Clinician/Patient discussion, Learn and utilize stress management and coping skills, and Participate in an exercise program that improves psychosocial symptoms    Target Goals  Return to ADLs/desired activities , Identify a positive support system , Maximize stress management/coping skills , Articulate confidence in adequate treatment of symptoms , and Improve quality of live, self-efficacy and depression screening scores as measured by the appropriate tool     Education Assessment  Stages of Change: Action  Barriers to Learning: No barriers  Personal Learning Style:Demonstration and Written  Social History     Tobacco Use   Smoking Status Former    Packs/day: 1.00    Years: 15.00    Pack years: 15.00    Types: Cigarettes    Quit date: 1958    Years since quittin.3   Smokeless Tobacco Never       Education Intervention/Learning Needs Identified  Blood pressure, Cholesterol, Exercise, Medications, Risk factor for CAD, Stregnth training, Stress management and relaxation, and Weight management    Target Goals  Adherence to medication regimen , Articulate understanding of self-management and prevention/treatment of cardiac disease , and Restore to highest level of independent functionality     Education  Mr. Suri Cordova was educated on the importance of creating positive support systems                           Provider Review and Approval of this ITP    The above treatment plan and goals have been set for your patient during Cardiac Rehabilitation. Please review and Electronically Cosign/ or Sign (if Fax Provider)            *Please comment and make changes to the EX RX or treatment plan if needed*                       Electronic Provider comment:              Please indicate with Cosign Comment.                    Electronically signed by Jennifer Primrose, RN on 22 at 4:45 PM EDT

## 2022-08-11 NOTE — PROGRESS NOTES
Patient is scheduled to begin Cardiac rehab classes at [facility] on Friday 8/12/202 at 10:30.

## 2022-08-12 ENCOUNTER — HOSPITAL ENCOUNTER (OUTPATIENT)
Dept: CARDIAC REHAB | Age: 87
Setting detail: THERAPIES SERIES
Discharge: HOME | End: 2022-08-12
Payer: MEDICARE

## 2022-08-12 PROCEDURE — 93798 PHYS/QHP OP CAR RHAB W/ECG: CPT

## 2022-08-15 ENCOUNTER — HOSPITAL ENCOUNTER (OUTPATIENT)
Dept: CARDIAC REHAB | Age: 87
Setting detail: THERAPIES SERIES
Discharge: HOME | End: 2022-08-15
Payer: MEDICARE

## 2022-08-15 PROCEDURE — 93798 PHYS/QHP OP CAR RHAB W/ECG: CPT

## 2022-08-17 ENCOUNTER — HOSPITAL ENCOUNTER (OUTPATIENT)
Dept: CARDIAC REHAB | Age: 87
Setting detail: THERAPIES SERIES
Discharge: HOME | End: 2022-08-17
Payer: MEDICARE

## 2022-08-17 PROCEDURE — 93798 PHYS/QHP OP CAR RHAB W/ECG: CPT

## 2022-08-19 ENCOUNTER — HOSPITAL ENCOUNTER (OUTPATIENT)
Dept: CARDIAC REHAB | Age: 87
Setting detail: THERAPIES SERIES
Discharge: HOME | End: 2022-08-19
Payer: MEDICARE

## 2022-08-19 ENCOUNTER — APPOINTMENT (OUTPATIENT)
Dept: CARDIAC REHAB | Age: 87
End: 2022-08-19
Payer: MEDICARE

## 2022-08-19 PROCEDURE — 93798 PHYS/QHP OP CAR RHAB W/ECG: CPT

## 2022-08-22 ENCOUNTER — APPOINTMENT (OUTPATIENT)
Dept: CARDIAC REHAB | Age: 87
End: 2022-08-22
Payer: MEDICARE

## 2022-08-22 ENCOUNTER — HOSPITAL ENCOUNTER (OUTPATIENT)
Dept: CARDIAC REHAB | Age: 87
Setting detail: THERAPIES SERIES
Discharge: HOME | End: 2022-08-22
Payer: MEDICARE

## 2022-08-22 PROCEDURE — 93798 PHYS/QHP OP CAR RHAB W/ECG: CPT

## 2022-08-24 ENCOUNTER — HOSPITAL ENCOUNTER (OUTPATIENT)
Dept: CARDIAC REHAB | Age: 87
Setting detail: THERAPIES SERIES
Discharge: HOME | End: 2022-08-24
Payer: MEDICARE

## 2022-08-24 ENCOUNTER — APPOINTMENT (OUTPATIENT)
Dept: CARDIAC REHAB | Age: 87
End: 2022-08-24
Payer: MEDICARE

## 2022-08-24 PROCEDURE — 93798 PHYS/QHP OP CAR RHAB W/ECG: CPT

## 2022-08-26 ENCOUNTER — APPOINTMENT (OUTPATIENT)
Dept: CARDIAC REHAB | Age: 87
End: 2022-08-26
Payer: MEDICARE

## 2022-08-26 ENCOUNTER — HOSPITAL ENCOUNTER (OUTPATIENT)
Dept: CARDIAC REHAB | Age: 87
Setting detail: THERAPIES SERIES
Discharge: HOME | End: 2022-08-26
Payer: MEDICARE

## 2022-08-26 PROCEDURE — 93798 PHYS/QHP OP CAR RHAB W/ECG: CPT

## 2022-08-29 ENCOUNTER — HOSPITAL ENCOUNTER (OUTPATIENT)
Dept: CARDIAC REHAB | Age: 87
Setting detail: THERAPIES SERIES
Discharge: HOME | End: 2022-08-29
Payer: MEDICARE

## 2022-08-29 ENCOUNTER — APPOINTMENT (OUTPATIENT)
Dept: CARDIAC REHAB | Age: 87
End: 2022-08-29
Payer: MEDICARE

## 2022-08-29 PROCEDURE — 93798 PHYS/QHP OP CAR RHAB W/ECG: CPT

## 2022-08-31 ENCOUNTER — APPOINTMENT (OUTPATIENT)
Dept: CARDIAC REHAB | Age: 87
End: 2022-08-31
Payer: MEDICARE

## 2022-08-31 ENCOUNTER — HOSPITAL ENCOUNTER (OUTPATIENT)
Dept: CARDIAC REHAB | Age: 87
Setting detail: THERAPIES SERIES
Discharge: HOME | End: 2022-08-31
Payer: MEDICARE

## 2022-08-31 ENCOUNTER — HOSPITAL ENCOUNTER (OUTPATIENT)
Dept: PHYSICAL THERAPY | Age: 87
Setting detail: THERAPIES SERIES
Discharge: HOME | End: 2022-08-31
Payer: MEDICARE

## 2022-08-31 PROCEDURE — 93798 PHYS/QHP OP CAR RHAB W/ECG: CPT

## 2022-08-31 PROCEDURE — 97140 MANUAL THERAPY 1/> REGIONS: CPT

## 2022-08-31 PROCEDURE — 97161 PT EVAL LOW COMPLEX 20 MIN: CPT

## 2022-08-31 PROCEDURE — 97110 THERAPEUTIC EXERCISES: CPT

## 2022-08-31 NOTE — PROGRESS NOTES
[facility]  [address]     [place], [facility]  Phone: [phone]   Fax:   [phone]    Dear Referring Provider (secondary): Dr Dat Nieto,    We had the pleasure of evaluating the following patient for physical therapy services at [address] [facility]. A summary of our findings can be found in the initial assessment below. This includes our plan of care. If you have any questions or concerns regarding these findings, please do not hesitate to contact me at the office phone number above. Thank you for this referral.       Physician Signature:_______________________________Date:__________________  By signing above (or electronic signature), therapist's plan is approved by physician        Patient: Evangelina Villatoro     : 1931     MRN: [id number]    Referring Provider (secondary): Dr Dat Nieto        Evaluation Date: 2022        Medical Diagnosis Information:  Diagnosis: R knee OA   Treatment Diagnosis: R knee pain, swelling, weakness, abnormality of gait                                           Insurance information: PT Insurance Information: Aetna Medicare, BMN, no precert needed     Relevant Medical History/Precautions/ Contra-indications: CAD, COPD, aortic valve replacement 22, hx of blood clots L leg 3 years ago, HTN, neuropathy, R knee scope   Latex Allergy:  [x]NO      []YES      Preferred Language for Healthcare:   [x]English       []Other:    C-SSRS Triggered by Intake questionnaire (Past 2 wk assessment):   [x] No, Questionnaire did not trigger screening.   [] Yes, Patient intake triggered further evaluation      [] C-SSRS Screening completed  [] PCP notified via Plan of Care  [] Emergency services notified      SUBJECTIVE FINDINGS      History of Present Illness:      Pt presents with C/o R knee pain for over a year.   Pt states he went to [address], had xrays, which showed only mild OA per pt, pt states MD wants to get MRI but also referred pt to PT. Pt will be returning to Dr. Zach Mckeon at some point after getting MRI. Pt states he also has decreased balance, no falls in past 6 months. Pain     intermittent pain R knee and post knee, calf and distal thigh. Pain is sharp. Patient reports pain is  1/10 pain at present and  7/10 pain at its worst.  Pain increases with : after sitting with knee flexed when he gets up, treadmill, prolonged walking  Decreases with: lying flat, leg extended in recliner, Tylenol, Blue Emu ointment. Pt. reports knee/hip/ankle crepitus, locking, buckling:   [x]Yes when first gets up    []No   []NA     Current Functional Limitations:   [x]Yes   []No  Functional complaints: sitting with prolonged flexion and then getting up, prolonged walking, steps, squatting, kneeling      PLOF:   []No functional limitations   [x]Pre-existing limitations:  cardiac, pulmonary  Pt's sleep is affected?    []Yes   [x]No       Functional Scale/Score:  Measure Used:  FOTO Functional Status Measure [facility])     Score:  FSM   47              Date Assessed:  8/31/22     Occupation/School: pt is a retired [employer]    Sport/recreational activities: playing golf, go fishing       Patient goal for therapy:   \" Patient goals : to get back to normal \"           OBJECTIVE FINDINGS       Gait/Steps     []Gait WNL unless otherwise noted below:                             [x]Deviations on a level linoleum surface include:  decreased evelyn, step length B, stance time on R; heel strike and toe off on R, antalgic    []Steps WNL (reciprocal pattern with 0-1 rail) unless otherwise noted below:    [x]Deviations include: step to pattern with 2 rails, up with L, down with R   []Not tested    Range of Motion/Strength Testing-Myotomes    [x] Blank box below indicates item is NOT TESTED       Range Tested MMT/ Resisted PROM AROM Comments   *denotes pain Left Right Left Right Left Right    Hip Flexion  (L1-2) 5 4     B hip decreased ROM   Hip Extension          Hip Abduction  (L5) 5 3-        Hip Adduction  (L3)          Hip IR          Hip ER 5 4- pain at knee        Knee Flexion  (L5,S1) 5 4- min pain    127 pulls    Knee Extension  (L3,4) 5 4-  Min pain  -12 from full extension      Ankle Dorsiflex  (L4) 5 5        Ankle Plantarflex  (S1,2) 4 3- pain            Flexibility     Muscle Abnormal Findings   Hip flexors/Obed  []WNL   [x]Decreased R   [x]Decreased L      Hamstrings  Degrees in 90/90 []WNL   [x]Decreased R   [x]Decreased L     Right:  -45            Left:   -35   Gastrocs []WNL   [x]Decreased R   [x]Decreased L        Special Tests- knee and ankle  [x]Not tested  Special Test Abnormal Findings   Anterior Drawer test []Neg   []Pos R   []Pos L      Posterior Drawer test []Neg   []Pos R   []Pos L      Lachman's test []Neg   []Pos R   []Pos L      Varus stress  []Neg   []Pos R   []Pos L      Valgus stress  []Neg   []Pos R   []Pos L      Danitza's test   []Neg   []Pos R   []Pos L      Apley's Compression []Neg   []Pos R   []Pos L      Apley's Distraction  []Neg   []Pos R   []Pos L      VMO tone []WNL []Dec R   []Dec L      [place] Ankle Rules []Neg   []Pos R   []Pos L        Palpation     Patient reported tenderness with palpation:  [x]Yes   []No   []NA  Location:  R lateral joint line and Lateral HS tendons   PT notes warmth:  []Yes   [x]No   []NA  Location:   PT notes increased muscle tone:   []Yes   []No   [x]NA  Location:   PT notes crepitus with palpation:   [x]Yes   []No   []NA   Location: min B knee  Ligament tenderness/provocation:   []Yes   []No   [x]NA  Location:   PT notes decreased scar mobility:   []Yes   []No   [x]NA  Location:      Appearance    PT notes swelling:   [x]Yes   []No   []NA  Location:   R knee  PT notes redness:  []Yes   [x]No   []NA  Location:   PT notes drainage:   []Yes   []No   [x]NA  Location:      Girth Measurements (cm)   []Not tested     Location Left Right Location Left Right   6 superior to superior patellar pole   3\" inferior to inferior patellar pole     3 superior to superior patellar pole    6\" inferior to inferior patellar pole     Superior patellar pole 41.8 42.2 Malleoli     Inferior patellar pole 39.0 39.6 Figure 8        Met heads        Specific Joint Mobility Testing/Accessory Motions    []NT  Lumbar:  Hip:  Knee/patella: decreased pat all directions  Ankle:     Bandages/Dressings/Incisions: [x]NA                       [x] Patient history, allergies, meds reviewed. Medical chart reviewed. See intake form. Review Of Systems (ROS):  [x]Performed Review of systems (Integumentary, CardioPulmonary, Neurological) by intake and observation. Intake form has been scanned into medical record. Patient has been instructed to contact their primary care physician regarding ROS issues if not already being addressed at this time.         Co-morbidities/Complexities (which will affect course of rehabilitation):   []None           Arthritic conditions   []Rheumatoid arthritis (M05.9)  [x]Osteoarthritis (M19.91)   Cardiovascular conditions   [x]Hypertension (I10)  []Hyperlipidemia (E78.5)  []Angina pectoris (I20)  [x]Atherosclerosis (I70)   Musculoskeletal conditions   []Disc pathology   []Congenital spine pathologies   []Prior surgical intervention  []Osteoporosis (M81.8)  []Osteopenia (M85.8)   Endocrine conditions   []Hypothyroid (E03.9)  []Hyperthyroid Gastrointestinal conditions   []Constipation (Z37.79)   Metabolic conditions   []Morbid obesity (E66.01)  []Diabetes type 1(E10.65) or 2 (E11.65)   [x]Neuropathy (G60.9)     Pulmonary conditions   []Asthma (J45)  []Coughing   [x]COPD (J44.9)   Psychological Disorders  []Anxiety (F41.9)  []Depression (F32.9)   []Other:   []Other:            Barriers to/and or personal factors that will affect rehab potential:   []None                 []Age  []Sex    []Smoker              []Motivation/Lack of Motivation [x]Co-Morbidities              []Cognitive Function, education/learning barriers              []Environmental, home barriers              []profession/work barriers  []past PT/medical experience  []other:  Justification:     Falls Risk Assessment (30 days): []NA  [x] Falls Risk assessed and no intervention required. [] Falls Risk assessed and Patient requires intervention due to being higher risk   Tinetti Score:  [] Falls education provided, including:         ASSESSMENT: Pt is  80 Y. O  male, presenting with diagnosis of Diagnosis: R knee OA. Assessment reveals deficits in strength, ROM, function,  as well as increased pain. Pt will benefit from cont PT to address these deficits and promote return to highest level of functional independence.       Functional Impairments:     [x]Noted lumbar/proximal hip/LE hypomobility   [x]Decreased LE functional ROM   [x]Decreased core/proximal hip strength and neuromuscular control   [x]Decreased LE functional strength   [x]Reduced balance/proprioceptive control   []other:      Functional Activity Limitations (from functional questionnaire and intake)   [x]Reduced ability to tolerate prolonged functional positions   [x]Reduced ability or difficulty with changes of positions or transfers between positions   []Reduced ability to maintain good posture and demonstrate good body mechanics with sitting, bending, and lifting   []Reduced ability to sleep   [x] Reduced ability or tolerance with driving and/or computer work   []Reduced ability to perform lifting, carrying tasks   [x]Reduced ability to squat   []Reduced ability to forward bend   [x]Reduced ability to ambulate prolonged functional periods/distances/surfaces   [x]Reduced ability to ascend/descend stairs   []Reduced ability to run, hop or jump   []other:     Participation Restrictions   [x]Reduced participation in self care activities   [x]Reduced participation in home management activities   []Reduced participation in work activities   [x]Reduced participation in social activities. [x]Reduced participation in sport activities. Classification :    []Signs/symptoms consistent with post-surgical status including decreased ROM, strength and function. []Signs/symptoms consistent with joint sprain/strain   []Signs/symptoms consistent with patella-femoral syndrome   [x]Signs/symptoms consistent with knee OA/hip OA   [x]Signs/symptoms consistent with internal derangement of knee/Hip   []Signs/symptoms consistent with functional hip weakness/NMR control      []Signs/symptoms consistent with tendinitis/tendinosis    []signs/symptoms consistent with pathology which may benefit from Dry needling      Prognosis/Rehab Potential:      []Excellent   [x]Good    []Fair   []Poor    Tolerance of evaluation/treatment:    []Excellent   [x]Good    []Fair   []Poor     Physical Therapy Evaluation Complexity Justification  [x] A history of present problem with:  [] no personal factors and/or comorbidities that impact the plan of care;  []1-2 personal factors and/or comorbidities that impact the plan of care  [x]3 personal factors and/or comorbidities that impact the plan of care  [x] An examination of body systems using standardized tests and measures addressing any of the following: body structures and functions (impairments), activity limitations, and/or participation restrictions;:  [] a total of 1-2 or more elements   [] a total of 3 or more elements   [x] a total of 4 or more elements   [x] A clinical presentation with:  [x] stable and/or uncomplicated characteristics   [] evolving clinical presentation with changing characteristics  [] unstable and unpredictable characteristics;   [x] Clinical decision making of [x] low, [] moderate, [] high complexity using standardized patient assessment instrument and/or measurable assessment of functional outcome.     [x] EVAL (LOW) 96518 (typically 20 minutes face-to-face)  [] EVAL (MOD) 00786 (typically 30 minutes face-to-face)  [] EVAL (HIGH) 89090 (typically 45 minutes face-to-face)  [] RE-EVAL      PLAN:      Plan Moving Forward/ For next visit:    Nustep    Pat mobs/manual therapy to promote full ROM  Progress strength and flexibility as able  Pain modulation/CP/vaso-pneumatic device for pain and swelling         Frequency/Duration:  2 days per week for 6 Weeks:  Interventions:  [x]  Therapeutic exercise including: strength training, ROM, for Lower extremity and core   [x]  NMR activation and proprioception for LE, Glutes and Core   [x]  Manual therapy as indicated for LE, Hip and spine to include: Dry Needling/IASTM, STM, PROM, Gr I-IV mobilizations, manipulation. [x]  Modalities as needed that may include: thermal agents, E-stim, Biofeedback, US, iontophoresis as indicated  [x]  Patient education on joint protection, postural re-education, activity modification, progression of HEP. HEP instruction: Pt provided HEP via [employer]         GOALS:  Patient stated goal: \" Patient goals : to get back to normal \"  [] Progressing: [] Met: [] Not Met: [] Adjusted      Therapist goals for Patient:   Short Term Goals: To be achieved in: 3 weeks  1. Independent in HEP and progression per patient tolerance, in order to prevent re-injury. [] Progressing: [] Met: [] Not Met: [] Adjusted  2. Patient will have a decrease in pain to facilitate improvement in movement, function, and ADLs as indicated by Functional Deficits. [] Progressing: [] Met: [] Not Met: [] Adjusted      Long Term Goals: To be achieved in: 6 weeks  1. FOTO score 64 or better to assist with reaching prior level of function. [] Progressing: [] Met: [] Not Met: [] Adjusted  2. Patient will demonstrate increased AROM to [facility] to allow for proper joint functioning as indicated by patients Functional Deficits. [] Progressing: [] Met: [] Not Met: [] Adjusted  3.  Patient will demonstrate an increase in Strength to 4+/5 or greater in LE to

## 2022-08-31 NOTE — FLOWSHEET NOTE
Physical Therapy Daily Treatment Note    [x]Daily Treatment Note    []Progress Note    [] Discharge Summary         Date:  2022    Patient Name:  Niko Hansen    :  1931  MRN: [id number]      Medical/Treatment Diagnosis Information:  Diagnosis: R knee OA  Treatment Diagnosis: R knee pain, swelling, weakness, abnormality of gait    Insurance/Certification information:  PT Insurance Information: Aetna Medicare, BMN, no precert needed    Physician Information:  Referring Provider (secondary): Dr Samuel Montero of [facility] signed :  []  Yes  [x] No  []  Cosign [x]  Fax    Date of Patient follow up with Physician: pt returns to Dr. Lucy Hobbs after MRI     Is this a Progress Report:     []  Yes  [x]  No      If Yes:  Date Range for reporting period:  Beginning 22  Ending    Progress report will be due (10 Rx or 30 days whichever is less): 42       Recertification will be due (POC Duration  / 90 days whichever is less): 22 or 12th visit       Visit # Insurance Allowable Auth Required     BMN []  Yes [x]  No        Functional Scale/Score:  Measure Used:  FOTO Functional Status Measure [facility])     Score: 47                 Date Assessed:  22   Next assessment due: 9/15/22       Latex Allergy:  [x]NO      []YES  Preferred Language for Healthcare:   [x]English       []other:    RESTRICTIONS/PRECAUTIONS: CAD, COPD, aortic valve replacement 22, hx of blood clots L leg 3 years ago, HTN, neuropathy, R knee scope      SUBJECTIVE:  See eval    Pain level: At eval:     intermittent pain R knee and post knee, calf and distal thigh. Pain is sharp.   Patient reports pain is  1/10 pain at present and  7/10 pain at its worst    Plan Moving Forward/ For next visit:    Nustep    Pat mobs/manual therapy to promote full ROM  Progress strength and flexibility as able  Pain modulation/CP/vaso-pneumatic device for pain and swelling          OBJECTIVE: See eval        Exercises/Interventions:     Exercises in bold performed in department today. Items not bolded are carried forward from prior visits for continuity of the record. Exercise/Equipment hold sets reps resistance Comments/cues HEP              THERAPEUTIC EXERCISE      []     QS over towel roll 5 1 10   [x]     SLR supine with QS and DF   1 10   [x]     Standing gastroc stretch B  30  3   [x]         []         []         []           []           []             []   NEUROMUSCULAR RE-ED      []           []           []           []           []           []           []           []     Therapeutic Exercise/Home Exercise Program:   32 minutes  Pt inst in role of PT, prognosis, plan of care, use of CP, activity modification, and benefits of therapy. HEP has been established, See above, pt given handout(s) of new exercises. Discussed playing golf tomorrow  Pt inst to do HEP 1-2x/day, work up to 3x10 with PREs  Pt inst to use CP 1-2x/day and given precautions   Discussed cardiac rehab exs       Therapeutic Activity:  0 minutes     Gait: 0 minutes    Neuromuscular Re-Education:  0 minutes      Canalith Repositioning Procedure:  0 minutes     Manual Therapy:  8 minutes  Pat mobs in supine, all directions    Modalities: 0 minutes          ASSESSMENT:  See eval     GOALS:  Patient stated goal: \" Patient goals : to get back to normal \"  [] Progressing: [] Met: [] Not Met: [] Adjusted        Therapist goals for Patient:   Short Term Goals: To be achieved in: 3 weeks  1. Independent in HEP and progression per patient tolerance, in order to prevent re-injury. [] Progressing: [] Met: [] Not Met: [] Adjusted  2. Patient will have a decrease in pain to facilitate improvement in movement, function, and ADLs as indicated by Functional Deficits. [] Progressing: [] Met: [] Not Met: [] Adjusted        Long Term Goals: To be achieved in: 6 weeks  1.  FOTO score 64 or better to assist with reaching prior level of function. [] Progressing: [] Met: [] Not Met: [] Adjusted  2. Patient will demonstrate increased AROM to [facility] to allow for proper joint functioning as indicated by patients Functional Deficits. [] Progressing: [] Met: [] Not Met: [] Adjusted  3. Patient will demonstrate an increase in Strength to 4+/5 or greater in LE to allow for proper functional mobility as indicated by patients Functional Deficits. [] Progressing: [] Met: [] Not Met: [] Adjusted  4. Patient will return to 90% of prior level  functional activities without increased symptoms or restriction. [] Progressing: [] Met: [] Not Met: [] Adjusted  5. Gait WNL with no significant deviations and pt able to do 4 steps with reciprocal pattern with 1 rail    [] Progressing: [] Met: [] Not Met: [] Adjusted         Overall Progression Towards Functional goals/ Treatment Progress Update:  [] Patient is progressing as expected towards functional goals listed. [] Progression is slowed due to complexities/Impairments listed. [] Progression has been slowed due to co-morbidities.   [x] Plan just implemented, too soon to assess goals progression <30days   [] Goals require adjustment due to lack of progress  [] Patient is not progressing as expected and requires additional follow up with physician  [] Patient has met goals as marked above   [] Other    Prognosis for POC: [x] Good [] Fair  [] Poor    Patient requires continued skilled intervention: [x] Yes  [] No    Treatment/Activity Tolerance:  [x] Patient able to complete treatment  [] Patient limited by fatigue  [] Patient limited by pain    [] Patient limited by other medical complications  [] Other:         PLAN: See eval  [] Continue per plan of care [] Alter current plan (see comments above)  [x] Plan of care initiated [] Hold pending MD visit [] Discharge        Therapeutic Exercise and NMR EXR  [x] (94036) Provided verbal/tactile cueing for activities related to strengthening, flexibility, endurance, ROM  for improvements in proximal strength and core control with self care, mobility, lifting and ambulation.  [] (60302) Provided verbal/tactile cueing for activities related to improving balance, coordination, kinesthetic sense, posture, motor skill, proprioception  to assist with core control in self care, mobility, lifting, and ambulation. Therapeutic Activities and Gait:    [] (15712 or 43117) Provided verbal/tactile cueing for activities related to improving balance, coordination, kinesthetic sense, posture, motor skill, proprioception and motor activation to allow for proper function  with self care and ADLs  [] (73389) Provided training and instruction to the patient for proper core and proximal hip recruitment and positioning with ambulation re-education     Home Exercise Program:    [x] (28692) Reviewed/Progressed HEP activities related to strengthening, flexibility, endurance, ROM of core, proximal hip and LE for functional self-care, mobility, lifting and ambulation   [] (21653) Reviewed/Progressed HEP activities related to improving balance, coordination, kinesthetic sense, posture, motor skill, proprioception of core, proximal hip and LE for self care, mobility, lifting, and ambulation      Manual Treatments:  PROM / STM / Oscillations-Mobs:  G-I, II, III, IV (PA's, Inf., Post.)  [x] (13750) Provided manual therapy to mobilize soft tissue/joints for the purpose of modulating pain, promoting relaxation,  increasing ROM, reducing/eliminating soft tissue swelling/inflammation/restriction, improving soft tissue extensibility and allowing for proper ROM for normal function with self care, mobility, lifting and ambulation.      CRP:  [] (87800) Canalith Repositioning procedure for the assessment, treatment and education of BPPV    Modalities:   [] Ultrasound   [] Estim    [] Mechanical traction    [] Vaso-pneumatic device   [] Ionto     Charges:  Timed Code Treatment Minutes: 40   Total Treatment Minutes: 60     Medicare Cap total YTD:      [x]N/A      [] $   Workers Comp Time Stamp    [x]N/A  (Per CPT and Total Treatment)    Time In:   Time Out:       [x] EVAL    [] Dry Needling  [x] DH(20155)   x   2  [] EStim Unattended 01015  [] NMR (80513)  x     [] Estim Attended  68917  [x] Manual (02492)  x   1  [] Mechanical Txn 62620  [] TA    x     [] Ultrasound  [] Gait   x                    [] Vaso  [] CRP    [] Ionto           [] Other:        Electronically signed by: Cisco Martinez PT , OMT-C, [id number]        Note: If patient does not return for scheduled/ recommended follow up visits, this note will serve as a discharge from care along with most recent update on progress.

## 2022-09-02 ENCOUNTER — APPOINTMENT (OUTPATIENT)
Dept: CARDIAC REHAB | Age: 87
End: 2022-09-02
Payer: MEDICARE

## 2022-09-02 ENCOUNTER — HOSPITAL ENCOUNTER (OUTPATIENT)
Dept: CARDIAC REHAB | Age: 87
Setting detail: THERAPIES SERIES
Discharge: HOME | End: 2022-09-02
Payer: MEDICARE

## 2022-09-02 VITALS — HEIGHT: 70 IN | BODY MASS INDEX: 27.63 KG/M2 | WEIGHT: 193 LBS

## 2022-09-02 PROCEDURE — 93798 PHYS/QHP OP CAR RHAB W/ECG: CPT

## 2022-09-05 ENCOUNTER — APPOINTMENT (OUTPATIENT)
Dept: CARDIAC REHAB | Age: 87
End: 2022-09-05
Payer: MEDICARE

## 2022-09-07 ENCOUNTER — HOSPITAL ENCOUNTER (OUTPATIENT)
Dept: CARDIAC REHAB | Age: 87
Setting detail: THERAPIES SERIES
Discharge: HOME | End: 2022-09-07
Payer: MEDICARE

## 2022-09-07 ENCOUNTER — APPOINTMENT (OUTPATIENT)
Dept: CARDIAC REHAB | Age: 87
End: 2022-09-07
Payer: MEDICARE

## 2022-09-07 ENCOUNTER — HOSPITAL ENCOUNTER (OUTPATIENT)
Dept: PHYSICAL THERAPY | Age: 87
Setting detail: THERAPIES SERIES
Discharge: HOME | End: 2022-09-07
Payer: MEDICARE

## 2022-09-07 PROCEDURE — 97016 VASOPNEUMATIC DEVICE THERAPY: CPT

## 2022-09-07 PROCEDURE — 93798 PHYS/QHP OP CAR RHAB W/ECG: CPT

## 2022-09-07 PROCEDURE — 97140 MANUAL THERAPY 1/> REGIONS: CPT

## 2022-09-07 PROCEDURE — 97110 THERAPEUTIC EXERCISES: CPT

## 2022-09-07 NOTE — FLOWSHEET NOTE
Physical Therapy Daily Treatment Note    [x]Daily Treatment Note    []Progress Note    [] Discharge Summary         Date:  2022    Patient Name:  Bernardo John    :  1931  MRN: [id number]      Medical/Treatment Diagnosis Information:  Diagnosis: R knee OA  Treatment Diagnosis: R knee pain, swelling, weakness, abnormality of gait    Insurance/Certification information:  PT Insurance Information: [employer] Medicare, BMN, no precert needed    Physician Information:  Referring Provider (secondary): Dr Linda Sanchez of [facility] signed :  []  Yes  [x] No  []  Cosign [x]  Fax    Date of Patient follow up with Physician: pt returns to Dr. Boone Peabody after MRI     Is this a Progress Report:     []  Yes  [x]  No      If Yes:  Date Range for reporting period:  Beginning 22  Ending    Progress report will be due (10 Rx or 30 days whichever is less):        Recertification will be due (POC Duration  / 90 days whichever is less): 22 or  visit       Visit # Insurance Allowable Auth Required     BMN []  Yes [x]  No        Functional Scale/Score:  Measure Used:  FOTO Functional Status Measure [facility])     Score: 47                 Date Assessed:  22   Next assessment due: 9/15/22       Latex Allergy:  [x]NO      []YES  Preferred Language for Healthcare:   [x]English       []other:    RESTRICTIONS/PRECAUTIONS: CAD, COPD, aortic valve replacement 22, hx of blood clots L leg 3 years ago, HTN, neuropathy, R knee scope      SUBJECTIVE:    Pt states Monday, pain was worse, had sharp pain when getting up after sitting. States he did \"pretty good\" with HEP. States he did not do any CP   States having MRI tomorrow at [facility]     Pain level:  pain 4/10 walking into dept today, pain as high as 7/10 since last visit. At eval:     intermittent pain R knee and post knee, calf and distal thigh. Pain is sharp.   Patient reports pain is  1/10 pain at present and  7/10 pain at its worst    Plan Moving Forward/ For next visit:    Nustep    Pat mobs/manual therapy to promote full ROM  Progress strength and flexibility as able  Pain modulation/CP/vaso-pneumatic device for pain and swelling          OBJECTIVE:       Exercises/Interventions:     Exercises in bold performed in department today. Items not bolded are carried forward from prior visits for continuity of the record. Exercise/Equipment hold sets reps resistance Comments/cues HEP              THERAPEUTIC EXERCISE      []     Nustep seat 12 arms 12     Level 4, 5 minutes (this is what pt does over in cardiac rehab)  Subjective info taken during ex      QS over towel roll 5 3 10   [x]     SLR supine with QS and DF   3 10   [x]     Standing gastroc stretch B  30  3   [x]     Supine knee extension stretch with towel roll under ankle  60  5   [x]    LAQ   2 10   [x]         []           []           []             []   NEUROMUSCULAR RE-ED      []           []           []           []           []           []           []           []     Therapeutic Exercise/Home Exercise Program:   35 minutes  HEP has been established, See above, reviewed, progressed, pt given handout(s) of new exercises. Pt inst to do HEP 1-2x/day, work up to 3x10 with PREs  Pt inst to use CP 1-2x/day and given precautions       Therapeutic Activity:  0 minutes     Gait: 0 minutes    Neuromuscular Re-Education:  0 minutes      Canalith Repositioning Procedure:  0 minutes     Manual Therapy:  8 minutes  Pat mobs in supine, all directions    Modalities: 15 minutes  Vaso-pnuematic device for swelling and pain, 15 minutes, 46 deg, low compression  No adverse effects noted      ASSESSMENT:      GOALS:  Patient stated goal: \" Patient goals : to get back to normal \"  [] Progressing: [] Met: [] Not Met: [] Adjusted        Therapist goals for Patient:   Short Term Goals: To be achieved in: 3 weeks  1.  Independent in HEP and progression per patient tolerance, in order to prevent re-injury. [] Progressing: [] Met: [] Not Met: [] Adjusted  2. Patient will have a decrease in pain to facilitate improvement in movement, function, and ADLs as indicated by Functional Deficits. [] Progressing: [] Met: [] Not Met: [] Adjusted        Long Term Goals: To be achieved in: 6 weeks  1. FOTO score 64 or better to assist with reaching prior level of function. [] Progressing: [] Met: [] Not Met: [] Adjusted  2. Patient will demonstrate increased AROM to [facility] to allow for proper joint functioning as indicated by patients Functional Deficits. [] Progressing: [] Met: [] Not Met: [] Adjusted  3. Patient will demonstrate an increase in Strength to 4+/5 or greater in LE to allow for proper functional mobility as indicated by patients Functional Deficits. [] Progressing: [] Met: [] Not Met: [] Adjusted  4. Patient will return to 90% of prior level  functional activities without increased symptoms or restriction. [] Progressing: [] Met: [] Not Met: [] Adjusted  5. Gait WNL with no significant deviations and pt able to do 4 steps with reciprocal pattern with 1 rail    [] Progressing: [] Met: [] Not Met: [] Adjusted         Overall Progression Towards Functional goals/ Treatment Progress Update:  [] Patient is progressing as expected towards functional goals listed. [] Progression is slowed due to complexities/Impairments listed. [] Progression has been slowed due to co-morbidities.   [x] Plan just implemented, too soon to assess goals progression <30days   [] Goals require adjustment due to lack of progress  [] Patient is not progressing as expected and requires additional follow up with physician  [] Patient has met goals as marked above   [] Other    Prognosis for POC: [x] Good [] Fair  [] Poor    Patient requires continued skilled intervention: [x] Yes  [] No    Treatment/Activity Tolerance:  [x] Patient able to complete treatment  [] Patient limited by fatigue  [] Patient limited by pain    [] Patient limited by other medical complications  [] Other:         PLAN:   [x] Continue per plan of care [] Alter current plan (see comments above)  [] Plan of care initiated [] Hold pending MD visit [] Discharge        Therapeutic Exercise and NMR EXR  [x] (32753) Provided verbal/tactile cueing for activities related to strengthening, flexibility, endurance, ROM  for improvements in proximal strength and core control with self care, mobility, lifting and ambulation.  [] (94758) Provided verbal/tactile cueing for activities related to improving balance, coordination, kinesthetic sense, posture, motor skill, proprioception  to assist with core control in self care, mobility, lifting, and ambulation.      Therapeutic Activities and Gait:    [] (68383 or 61193) Provided verbal/tactile cueing for activities related to improving balance, coordination, kinesthetic sense, posture, motor skill, proprioception and motor activation to allow for proper function  with self care and ADLs  [] (53898) Provided training and instruction to the patient for proper core and proximal hip recruitment and positioning with ambulation re-education     Home Exercise Program:    [x] (28140) Reviewed/Progressed HEP activities related to strengthening, flexibility, endurance, ROM of core, proximal hip and LE for functional self-care, mobility, lifting and ambulation   [] (24020) Reviewed/Progressed HEP activities related to improving balance, coordination, kinesthetic sense, posture, motor skill, proprioception of core, proximal hip and LE for self care, mobility, lifting, and ambulation      Manual Treatments:  PROM / STM / Oscillations-Mobs:  G-I, II, III, IV (PA's, Inf., Post.)  [x] (55674) Provided manual therapy to mobilize soft tissue/joints for the purpose of modulating pain, promoting relaxation,  increasing ROM, reducing/eliminating soft tissue swelling/inflammation/restriction, improving soft tissue extensibility and allowing for proper ROM for normal function with self care, mobility, lifting and ambulation. CRP:  [] (10080) Canalith Repositioning procedure for the assessment, treatment and education of BPPV    Modalities:   [] Ultrasound   [] Estim    [] Mechanical traction    [x] Vaso-pneumatic device   [] Ionto     Charges:  Timed Code Treatment Minutes: 43   Total Treatment Minutes: 62     Medicare Cap total YTD:      [x]N/A      [] $   Workers Comp Time Stamp    [x]N/A  (Per CPT and Total Treatment)    Time In:   Time Out:       [] EVAL    [] Dry Needling  [x] ZK(67024)   x   2  [] EStim Unattended 11686  [] NMR (37335)  x     [] Estim Attended  33513  [x] Manual (72847)  x   1  [] Mechanical Txn 89854  [] TA    x     [] Ultrasound  [] Gait   x                    [x] Vaso  [] CRP    [] Ionto           [] Other:        Electronically signed by: Paige Hung PT , OMT-C, [id number]        Note: If patient does not return for scheduled/ recommended follow up visits, this note will serve as a discharge from care along with most recent update on progress.

## 2022-09-07 NOTE — PLAN OF CARE
Individual Treatment Plan  Cardiac Rehabilitation    Reassessment  Name: Daniella Whitaker Reassessment: 30 days   : 1931 Primary Diagnosis:   DANIEL   Age: 80 y.o. Referring Physician:  Wilman Peraza MD   MRN: [id number] Primary Care Physician: Jennifer Toth MD     Allergies   Allergen Reactions    Ezetimibe Other (See Comments)     aches  aches    aches    Colestipol      aches    Statins Other (See Comments)     ALSO LIPITOR, VYTORIN       Date: 2022    Exercise Assessment  Stages of Change: Action   Risk Stratification: Medium  Fall Risk: No flowsheet data found. Assistive Devices: None  Sessions: 10/36   Patient attends rehab regularly  Max MET level: 3.9  Resting BP range: 102//76  Recovery BP range: 98//62  Patient in SR-ST with occasional PVC's and rarely couplets. Antihypertensives: yes -   amLODIPine (NORVASC) 5 MG tablet (Taking)      Class: Historical Med     irbesartan (AVAPRO) 75 MG tablet (Taking)       Exercise Prescription        Mode: . Treadmill, Bike (Upright or recumbent), NuStep, Rower, Arm Ergometer, Recumbent Eliptical, and Walking      Frequency: 2-3 days/week supervised      Intensity:RPE 11-14      Current Target Heart Rate: Resting HR + 20-30  New Target Heart Rate zone:      Duration: 32+ minutes/day      Resistance Training: Yes, 3 days per week      Progression: Increase exercise by 0.5 METs every 1 to 2 weeks to goal of 4 to 6 METs      Supplemental oxygen: is not on home oxygen therapy. Plan  Home Exercise:Yes  Mode:Walking  Resistance Training: no     Target Goals  Achieve exercise to 3-4.9 METs  and Increase exercise endurance and stamina   Previous goals Adhere to cardiac exercise guidelines , Increase exercise endurance and stamina , Increase functional capacity as compared to initial assessment , and Participate in strength training   Prior Goal Progression: Patient achieved max MET level:3.9.  Patient demonstrates cardiac exercise guidelines. Patient able to exercise for 32 minutes. Patient using 4 lb weights. Education  Mr. David Harvey was educated on the importance of Importance of physical activity and exercise progression    Nutrition Assessment  Stages of Change: Action            Nutrition Survey: Rate Your Plate Score:       8-24% Weight Loss: 5 %   Patient Weight Goal: 170 lb   Recommended Diet: minimize processed foods, minimize simple sugars, and increase fiber intake  Diabetic:  No  Diabetic Medications: no  A1C:  No results found for: LABA1C               Fasting Blood Glucose:   Lab Results   Component Value Date     GLUCOSE 152 (H) 05/29/2022      No results found for: CHOL 161 No results found for: HDL 54   No results found for: LDLCALC, LDLCHOLESTEROL, LDLDIRECT 76 No results found for: TRIG 156     Chol/HDL Ratio 0 - 5 3.0     Cholesterol 125 - 199 mg/dL 161  TOTAL CHOLESTEROL INTERPRETATION:   Less than 200 mg/dL Desireable   200-239 mg/dL Borderline   Greater or Equal to 240 mg/dL High   LDL Calculated 0 - 100 mg/dL 76  LDL CHOLESTEROL INTERPRETATION:     Less than 100 mg/dL Optimal   100-129 mg/dL Near optimal/above optimal   130-159 mg/dL Borderline High   160-189 mg/dL High   Greater or Equal to 190 mg/dL Very High   HDL 40 - 180 mg/dL 54  HDL CHOLESTEROL INTERPRETATION:     Less than 40 mg/dL Low   Greater than 60 mg/dL Desirable   Triglycerides 0 - 149 mg/dL 156 High       Nutrition Intervention  Attend education classes related to nutrition and Participate in an exercise program that promotes weight loss    Target Goals  Complete cardiac diet classes  and Reduce weight     Previous goals Articulate heart healthy diet , Complete cardiac diet classes , Control blood pressure , and Reduce weight  Prior Goal Progression: Patient attended education on exercise, weight management and nutrition. Education  Mr. David Harvey was educated on the importance of maintaining optimal weight/BMI and nutrition guidelines.     Psychosocial Assessment  Stages of Change: Contemplation  Occupation: Retired. Psychosocial Test  Tool Used: PHQ-2/9 Today's PHQ:    PHQ Scores 2022   PHQ2 Score 0   PHQ9 Score 1     Interpretation of Total Score Depression Severity: 1-4 = Minimal depression, 5-9 = Mild depression, 10-14 = Moderate depression, 15-19 = Moderately severe depression, 20-27 = Severe depression    Reports psychosocial symptoms: no  Currently on medication therapy: no  Currently seeing a mental health professional: no  Positive support system: yes    Psychosocial Intervention  Participate in an exercise program that improves psychosocial symptoms    Target Goals  Return to ADLs/desired activities   Previous goals Return to ADLs/desired activities , Identify a positive support system , Maximize stress management/coping skills , Articulate confidence in adequate treatment of symptoms , and Improve quality of live, self-efficacy and depression screening scores as measured by the appropriate tool   Prior Goal Progression: Patient family supportive. Patient feels he is doing well with ADL's and desired activities. Education Assessment  Stages of Change: Action  Barriers to Learning: No barriers  Personal Learning Style: Demonstration and Verbal  Social History     Tobacco Use   Smoking Status Former    Packs/day: 1.00    Years: 15.00    Pack years: 15.00    Types: Cigarettes    Quit date: 1958    Years since quittin.3   Smokeless Tobacco Never       Education Intervention/Learning Needs Identified  Weight management    Target Goals  Attend appropriate group education classes     Previous goals Adherence to medication regimen , Articulate understanding of self-management and prevention/treatment of cardiac disease , and Restore to highest level of independent functionality    Prior Goal Progress: Patient taking medications as prescribed. Patient is able to function independently.    Education  Mr. Jennifer Olivera was educated on the importance of relaxation techniques                           Provider Review and Approval of this ITP    The above treatment plan and goals have been set for your patient during Cardiac Rehabilitation. Please review and Electronically Cosign/ or Sign (if Fax Provider)            *Please comment and make changes to the EX RX or treatment plan if needed*                       Electronic Provider comment:              Please indicate with Cosign Comment.                    Electronically signed by John Obrien RN on 9/7/22 at 11:48 AM EDT

## 2022-09-09 ENCOUNTER — APPOINTMENT (OUTPATIENT)
Dept: CARDIAC REHAB | Age: 87
End: 2022-09-09
Payer: MEDICARE

## 2022-09-09 ENCOUNTER — HOSPITAL ENCOUNTER (OUTPATIENT)
Dept: CARDIAC REHAB | Age: 87
Setting detail: THERAPIES SERIES
Discharge: HOME | End: 2022-09-09
Payer: MEDICARE

## 2022-09-09 PROCEDURE — 93798 PHYS/QHP OP CAR RHAB W/ECG: CPT

## 2022-09-12 ENCOUNTER — HOSPITAL ENCOUNTER (OUTPATIENT)
Dept: CARDIAC REHAB | Age: 87
Setting detail: THERAPIES SERIES
Discharge: HOME | End: 2022-09-12
Payer: MEDICARE

## 2022-09-12 ENCOUNTER — APPOINTMENT (OUTPATIENT)
Dept: CARDIAC REHAB | Age: 87
End: 2022-09-12
Payer: MEDICARE

## 2022-09-12 PROCEDURE — 93798 PHYS/QHP OP CAR RHAB W/ECG: CPT

## 2022-09-14 ENCOUNTER — APPOINTMENT (OUTPATIENT)
Dept: PHYSICAL THERAPY | Age: 87
End: 2022-09-14
Payer: MEDICARE

## 2022-09-14 ENCOUNTER — APPOINTMENT (OUTPATIENT)
Dept: CARDIAC REHAB | Age: 87
End: 2022-09-14
Payer: MEDICARE

## 2022-09-14 ENCOUNTER — HOSPITAL ENCOUNTER (OUTPATIENT)
Dept: CARDIAC REHAB | Age: 87
Setting detail: THERAPIES SERIES
Discharge: HOME | End: 2022-09-14
Payer: MEDICARE

## 2022-09-14 PROCEDURE — 93798 PHYS/QHP OP CAR RHAB W/ECG: CPT

## 2022-09-16 ENCOUNTER — APPOINTMENT (OUTPATIENT)
Dept: CARDIAC REHAB | Age: 87
End: 2022-09-16
Payer: MEDICARE

## 2022-09-16 ENCOUNTER — HOSPITAL ENCOUNTER (OUTPATIENT)
Dept: CARDIAC REHAB | Age: 87
Setting detail: THERAPIES SERIES
Discharge: HOME | End: 2022-09-16
Payer: MEDICARE

## 2022-09-16 PROCEDURE — 93798 PHYS/QHP OP CAR RHAB W/ECG: CPT

## 2022-09-19 ENCOUNTER — APPOINTMENT (OUTPATIENT)
Dept: CARDIAC REHAB | Age: 87
End: 2022-09-19
Payer: MEDICARE

## 2022-09-19 ENCOUNTER — HOSPITAL ENCOUNTER (OUTPATIENT)
Dept: CARDIAC REHAB | Age: 87
Setting detail: THERAPIES SERIES
Discharge: HOME | End: 2022-09-19
Payer: MEDICARE

## 2022-09-19 PROCEDURE — 93798 PHYS/QHP OP CAR RHAB W/ECG: CPT

## 2022-09-21 ENCOUNTER — APPOINTMENT (OUTPATIENT)
Dept: PHYSICAL THERAPY | Age: 87
End: 2022-09-21
Payer: MEDICARE

## 2022-09-21 ENCOUNTER — APPOINTMENT (OUTPATIENT)
Dept: CARDIAC REHAB | Age: 87
End: 2022-09-21
Payer: MEDICARE

## 2022-09-23 ENCOUNTER — APPOINTMENT (OUTPATIENT)
Dept: CARDIAC REHAB | Age: 87
End: 2022-09-23
Payer: MEDICARE

## 2022-09-23 ENCOUNTER — HOSPITAL ENCOUNTER (OUTPATIENT)
Dept: CARDIAC REHAB | Age: 87
Setting detail: THERAPIES SERIES
Discharge: HOME | End: 2022-09-23
Payer: MEDICARE

## 2022-09-23 PROCEDURE — 93798 PHYS/QHP OP CAR RHAB W/ECG: CPT

## 2022-09-26 ENCOUNTER — HOSPITAL ENCOUNTER (OUTPATIENT)
Dept: CARDIAC REHAB | Age: 87
Setting detail: THERAPIES SERIES
Discharge: HOME | End: 2022-09-26
Payer: MEDICARE

## 2022-09-26 ENCOUNTER — APPOINTMENT (OUTPATIENT)
Dept: PHYSICAL THERAPY | Age: 87
End: 2022-09-26
Payer: MEDICARE

## 2022-09-26 ENCOUNTER — APPOINTMENT (OUTPATIENT)
Dept: CARDIAC REHAB | Age: 87
End: 2022-09-26
Payer: MEDICARE

## 2022-09-26 PROCEDURE — 93798 PHYS/QHP OP CAR RHAB W/ECG: CPT

## 2022-09-28 ENCOUNTER — APPOINTMENT (OUTPATIENT)
Dept: CARDIAC REHAB | Age: 87
End: 2022-09-28
Payer: MEDICARE

## 2022-09-28 ENCOUNTER — HOSPITAL ENCOUNTER (OUTPATIENT)
Dept: CARDIAC REHAB | Age: 87
Setting detail: THERAPIES SERIES
Discharge: HOME | End: 2022-09-28
Payer: MEDICARE

## 2022-09-28 ENCOUNTER — APPOINTMENT (OUTPATIENT)
Dept: PHYSICAL THERAPY | Age: 87
End: 2022-09-28
Payer: MEDICARE

## 2022-09-28 PROCEDURE — 93798 PHYS/QHP OP CAR RHAB W/ECG: CPT

## 2022-09-30 ENCOUNTER — HOSPITAL ENCOUNTER (OUTPATIENT)
Dept: CARDIAC REHAB | Age: 87
Setting detail: THERAPIES SERIES
Discharge: HOME | End: 2022-09-30
Payer: MEDICARE

## 2022-09-30 ENCOUNTER — APPOINTMENT (OUTPATIENT)
Dept: CARDIAC REHAB | Age: 87
End: 2022-09-30
Payer: MEDICARE

## 2022-09-30 PROCEDURE — 93798 PHYS/QHP OP CAR RHAB W/ECG: CPT

## 2022-10-03 ENCOUNTER — HOSPITAL ENCOUNTER (OUTPATIENT)
Dept: CARDIAC REHAB | Age: 87
Setting detail: THERAPIES SERIES
Discharge: HOME | End: 2022-10-03
Payer: MEDICARE

## 2022-10-03 ENCOUNTER — APPOINTMENT (OUTPATIENT)
Dept: CARDIAC REHAB | Age: 87
End: 2022-10-03
Payer: MEDICARE

## 2022-10-03 PROCEDURE — 93798 PHYS/QHP OP CAR RHAB W/ECG: CPT

## 2022-10-05 ENCOUNTER — APPOINTMENT (OUTPATIENT)
Dept: CARDIAC REHAB | Age: 87
End: 2022-10-05
Payer: MEDICARE

## 2022-10-05 ENCOUNTER — HOSPITAL ENCOUNTER (OUTPATIENT)
Dept: CARDIAC REHAB | Age: 87
Setting detail: THERAPIES SERIES
Discharge: HOME | End: 2022-10-05
Payer: MEDICARE

## 2022-10-05 PROCEDURE — 93798 PHYS/QHP OP CAR RHAB W/ECG: CPT

## 2022-10-07 ENCOUNTER — APPOINTMENT (OUTPATIENT)
Dept: CARDIAC REHAB | Age: 87
End: 2022-10-07
Payer: MEDICARE

## 2022-10-07 ENCOUNTER — HOSPITAL ENCOUNTER (OUTPATIENT)
Dept: CARDIAC REHAB | Age: 87
Setting detail: THERAPIES SERIES
Discharge: HOME | End: 2022-10-07
Payer: MEDICARE

## 2022-10-07 PROCEDURE — 93798 PHYS/QHP OP CAR RHAB W/ECG: CPT

## 2022-10-10 ENCOUNTER — HOSPITAL ENCOUNTER (OUTPATIENT)
Dept: CARDIAC REHAB | Age: 87
Setting detail: THERAPIES SERIES
Discharge: HOME | End: 2022-10-10
Payer: MEDICARE

## 2022-10-10 ENCOUNTER — APPOINTMENT (OUTPATIENT)
Dept: CARDIAC REHAB | Age: 87
End: 2022-10-10
Payer: MEDICARE

## 2022-10-10 PROCEDURE — 93798 PHYS/QHP OP CAR RHAB W/ECG: CPT

## 2022-10-10 NOTE — PLAN OF CARE
Individual Treatment Plan  Cardiac Rehabilitation    Reassessment  Name: Staci Whitaker Reassessment: 60 days   : 1931 Primary Diagnosis: DANIEL    Age: 80 y.o. Referring Physician:  Dr. Shannan Marr MD   MRN: [id number] Primary Care Physician: Flora Head MD     Allergies   Allergen Reactions    Ezetimibe Other (See Comments)     aches  aches    aches    Colestipol      aches    Statins Other (See Comments)     ALSO LIPITOR, VYTORIN       Date: 10/10/2022    Exercise Assessment  Stages of Change: Action   Risk Stratification: Medium  Fall Risk: No flowsheet data found. Assistive Devices: None    Antihypertensives: yes -   amLODIPine (NORVASC) 5 MG tablet (Taking)       Class: Historical Med     irbesartan (AVAPRO) 75 MG tablet (Taking)       Exercise Prescription        Mode: . Treadmill, Bike (Upright or recumbent), NuStep, Rower, SciFit, Airdyne, Arm Ergometer, Recumbent Eliptical, and Walking      Frequency: 2-3 days/week supervised      Intensity:RPE 11-14      Current Target Heart Rate:   New Target Heart Rate zone:no change      Duration: 30+ minutes/day      Resistance Training: Yes, 3 days per week      Progression: Increase exercise by 0.5 METs every 1 to 2 weeks to goal of 4 to 6 METs and Increase exercise by 5 minutes every week to goal of 30 to 50 minutes      Supplemental oxygen: is not on home oxygen therapy.     Plan  Home Exercise:Yes  Mode:Walking  Resistance Training: no     Target Goals  Achieve exercise to 3-4.9 METs , Adhere to 5 days per week exercise program , Adhere to cardiac exercise guidelines , Increase exercise endurance and stamina , and Participate in strength training   Previous goals Achieve exercise to 3-4.9 METs  and Increase exercise endurance and stamina   Previous goals Adhere to cardiac exercise guidelines , Increase exercise endurance and stamina , Increase functional capacity as compared to initial assessment , and Participate in strength Score Depression Severity: 1-4 = Minimal depression, 5-9 = Mild depression, 10-14 = Moderate depression, 15-19 = Moderately severe depression, 20-27 = Severe depression    Reports psychosocial symptoms: no  Currently on medication therapy: no  Currently seeing a mental health professional: no  Positive support system: yes    Psychosocial Intervention  Attend education classes related to stress management and relaxation, Learn and utilize stress management and coping skills, and Participate in an exercise program that improves psychosocial symptoms    Target Goals  Return to ADLs/desired activities  and Maximize stress management/coping skills   Previous goals Return to ADLs/desired activities   Previous goals Return to ADLs/desired activities , Identify a positive support system , Maximize stress management/coping skills , Articulate confidence in adequate treatment of symptoms , and Improve quality of live, self-efficacy and depression screening scores as measured by the appropriate tool     Education Assessment  Stages of Change: Action  Barriers to Learning: No barriers  Personal Learning Style: Verbal  Social History     Tobacco Use   Smoking Status Former    Packs/day: 1.00    Years: 15.00    Pack years: 15.00    Types: Cigarettes    Quit date: 1958    Years since quittin.4   Smokeless Tobacco Never       Education Intervention/Learning Needs Identified  Exercise, Medications, Nutrition, and Weight management    Target Goals  Articulate understanding of self-management and prevention/treatment of cardiac disease , Attend appropriate web education classes , and Restore to highest level of independent functionality     Previous goals Adherence to medication regimen , Articulate understanding of self-management and prevention/treatment of cardiac disease , and Restore to highest level of independent functionality   Education  Mr. Gigi Patel was educated on the importance of healthy coping techniques and stress management                           Provider Review and Approval of this ITP    The above treatment plan and goals have been set for your patient during Cardiac Rehabilitation. Please review and Electronically Cosign/ or Sign (if Fax Provider)            *Please comment and make changes to the EX RX or treatment plan if needed*                       Electronic Provider comment:              Please indicate with Cosign Comment.                    Electronically signed by Wing Andre RN on 10/10/22 at 12:26 PM EDT

## 2022-10-12 ENCOUNTER — HOSPITAL ENCOUNTER (OUTPATIENT)
Dept: CARDIAC REHAB | Age: 87
Setting detail: THERAPIES SERIES
Discharge: HOME | End: 2022-10-12
Payer: MEDICARE

## 2022-10-12 ENCOUNTER — APPOINTMENT (OUTPATIENT)
Dept: CARDIAC REHAB | Age: 87
End: 2022-10-12
Payer: MEDICARE

## 2022-10-12 PROCEDURE — 93798 PHYS/QHP OP CAR RHAB W/ECG: CPT

## 2022-10-14 ENCOUNTER — APPOINTMENT (OUTPATIENT)
Dept: CARDIAC REHAB | Age: 87
End: 2022-10-14
Payer: MEDICARE

## 2022-10-14 ENCOUNTER — HOSPITAL ENCOUNTER (OUTPATIENT)
Dept: CARDIAC REHAB | Age: 87
Setting detail: THERAPIES SERIES
Discharge: HOME | End: 2022-10-14
Payer: MEDICARE

## 2022-10-14 PROCEDURE — 93798 PHYS/QHP OP CAR RHAB W/ECG: CPT

## 2022-10-17 ENCOUNTER — APPOINTMENT (OUTPATIENT)
Dept: CARDIAC REHAB | Age: 87
End: 2022-10-17
Payer: MEDICARE

## 2022-10-17 ENCOUNTER — HOSPITAL ENCOUNTER (OUTPATIENT)
Dept: CARDIAC REHAB | Age: 87
Setting detail: THERAPIES SERIES
Discharge: HOME | End: 2022-10-17
Payer: MEDICARE

## 2022-10-17 PROCEDURE — 93798 PHYS/QHP OP CAR RHAB W/ECG: CPT

## 2022-10-19 ENCOUNTER — HOSPITAL ENCOUNTER (OUTPATIENT)
Dept: CARDIAC REHAB | Age: 87
Setting detail: THERAPIES SERIES
Discharge: HOME | End: 2022-10-19
Payer: MEDICARE

## 2022-10-19 ENCOUNTER — APPOINTMENT (OUTPATIENT)
Dept: CARDIAC REHAB | Age: 87
End: 2022-10-19
Payer: MEDICARE

## 2022-10-19 PROCEDURE — 93798 PHYS/QHP OP CAR RHAB W/ECG: CPT

## 2022-10-21 ENCOUNTER — HOSPITAL ENCOUNTER (OUTPATIENT)
Dept: CARDIAC REHAB | Age: 87
Setting detail: THERAPIES SERIES
Discharge: HOME | End: 2022-10-21
Payer: MEDICARE

## 2022-10-21 ENCOUNTER — APPOINTMENT (OUTPATIENT)
Dept: CARDIAC REHAB | Age: 87
End: 2022-10-21
Payer: MEDICARE

## 2022-10-21 PROCEDURE — 93798 PHYS/QHP OP CAR RHAB W/ECG: CPT

## 2022-10-24 ENCOUNTER — APPOINTMENT (OUTPATIENT)
Dept: CARDIAC REHAB | Age: 87
End: 2022-10-24
Payer: MEDICARE

## 2022-10-24 ENCOUNTER — HOSPITAL ENCOUNTER (OUTPATIENT)
Dept: CARDIAC REHAB | Age: 87
Setting detail: THERAPIES SERIES
Discharge: HOME | End: 2022-10-24
Payer: MEDICARE

## 2022-10-24 PROCEDURE — 93798 PHYS/QHP OP CAR RHAB W/ECG: CPT

## 2022-10-26 ENCOUNTER — APPOINTMENT (OUTPATIENT)
Dept: CARDIAC REHAB | Age: 87
End: 2022-10-26
Payer: MEDICARE

## 2022-10-28 ENCOUNTER — APPOINTMENT (OUTPATIENT)
Dept: CARDIAC REHAB | Age: 87
End: 2022-10-28
Payer: MEDICARE

## 2022-10-28 ENCOUNTER — HOSPITAL ENCOUNTER (OUTPATIENT)
Dept: CARDIAC REHAB | Age: 87
Setting detail: THERAPIES SERIES
Discharge: HOME | End: 2022-10-28
Payer: MEDICARE

## 2022-10-28 PROCEDURE — 93798 PHYS/QHP OP CAR RHAB W/ECG: CPT

## 2022-10-31 ENCOUNTER — HOSPITAL ENCOUNTER (OUTPATIENT)
Dept: CARDIAC REHAB | Age: 87
Setting detail: THERAPIES SERIES
Discharge: HOME | End: 2022-10-31
Payer: MEDICARE

## 2022-10-31 ENCOUNTER — APPOINTMENT (OUTPATIENT)
Dept: CARDIAC REHAB | Age: 87
End: 2022-10-31
Payer: MEDICARE

## 2022-10-31 PROCEDURE — 93798 PHYS/QHP OP CAR RHAB W/ECG: CPT

## 2022-11-02 ENCOUNTER — APPOINTMENT (OUTPATIENT)
Dept: CARDIAC REHAB | Age: 87
End: 2022-11-02
Payer: MEDICARE

## 2022-11-02 ENCOUNTER — HOSPITAL ENCOUNTER (OUTPATIENT)
Dept: CARDIAC REHAB | Age: 87
Setting detail: THERAPIES SERIES
Discharge: HOME | End: 2022-11-02
Payer: MEDICARE

## 2022-11-02 PROCEDURE — 93798 PHYS/QHP OP CAR RHAB W/ECG: CPT

## 2022-11-04 ENCOUNTER — HOSPITAL ENCOUNTER (OUTPATIENT)
Dept: CARDIAC REHAB | Age: 87
Setting detail: THERAPIES SERIES
Discharge: HOME | End: 2022-11-04
Payer: MEDICARE

## 2022-11-04 ENCOUNTER — APPOINTMENT (OUTPATIENT)
Dept: CARDIAC REHAB | Age: 87
End: 2022-11-04
Payer: MEDICARE

## 2022-11-04 PROCEDURE — 93798 PHYS/QHP OP CAR RHAB W/ECG: CPT

## 2022-11-07 ENCOUNTER — APPOINTMENT (OUTPATIENT)
Dept: CARDIAC REHAB | Age: 87
End: 2022-11-07
Payer: MEDICARE

## 2022-11-07 ENCOUNTER — HOSPITAL ENCOUNTER (OUTPATIENT)
Dept: CARDIAC REHAB | Age: 87
Setting detail: THERAPIES SERIES
Discharge: HOME | End: 2022-11-07
Payer: MEDICARE

## 2022-11-07 VITALS — WEIGHT: 194 LBS | BODY MASS INDEX: 27.77 KG/M2 | HEIGHT: 70 IN

## 2022-11-07 PROCEDURE — 93798 PHYS/QHP OP CAR RHAB W/ECG: CPT

## 2022-11-07 NOTE — PLAN OF CARE
Individual Treatment Plan  Cardiac Rehabilitation    Reassessment  Name: Uriah Whitaker Reassessment: 80 days   : 1931 Primary Diagnosis: DANIEL    Age: 80 y.o. Referring Physician:  Dr. Addie Spears   MRN: [id number] Primary Care Physician: Madhav Sena MD     Allergies   Allergen Reactions    Ezetimibe Other (See Comments)     aches  aches    aches    Colestipol      aches    Statins Other (See Comments)     ALSO LIPITOR, VYTORIN       Date: 2022    Exercise Assessment  Stages of Change: Action   Risk Stratification: Medium  Fall Risk: No flowsheet data found. Assistive Devices: None  Sessions completed:     Antihypertensives: yes -   amLODIPine (NORVASC) 5 MG tablet (Taking)       Class: Historical Med     irbesartan (AVAPRO) 75 MG tablet (Taking)          Exercise Prescription        Mode: . Treadmill, Bike (Upright or recumbent), NuStep, SciFit, Arm Ergometer, Recumbent Eliptical, and Walking      Frequency: 2-3 days/week supervised      Intensity:RPE 11-14      Current Target Heart Rate:   New Target Heart Rate zone:no change      Duration: 30+ minutes/day      Resistance Training: Yes, 3 days per week      Progression: Increase exercise by 0.5 METs every 1 to 2 weeks to goal of 4 to 6 METs3      Supplemental oxygen: is not on home oxygen therapy. Plan  Home Exercise:Yes  Mode:Walking  Resistance Training: no     Target Goals  Achieve exercise to 3-4.9 METs   Previous goals Achieve exercise to 3-4.9 METs , Adhere to 5 days per week exercise program , Adhere to cardiac exercise guidelines , Increase exercise endurance and stamina , and Participate in strength training   Previous goals Achieve exercise to 3-4.9 METs  and Increase exercise endurance and stamina     Progress toward goal: patient attends rehab regularly. Patient able to exercise in target RPE/RPD zones. Max MET level 3.0.     Education  Mr. Addison Ortega was educated on the importance of warm up and cool down, signs and symptoms to report, exercise safety, Importance of physical activity and exercise progression, equipment orientation, home exercise program, low sodium diet, blood pressure education, and blood pressure medication    Nutrition Assessment  Stages of Change: Pre-Contemplation  Height: 5' 10\" (177.8 cm) Weight: 194 lb (88 kg) BMI (Calculated): 27.9     Nutrition Survey: Rate Your Plate Score:       6-37% Weight Loss: 5%   Patient Weight Goal: 170 lb   Recommended Diet: minimize processed foods, reduce sodium intake, minimize simple sugars, and increase fiber intake  Diabetic:  No  Diabetic Medications: no  A1C:  No results found for: LABA1C               Fasting Blood Glucose: No results found for: GLU   No results found for: CHOL  No results found for: HDL    No results found for: LDLCALC, LDLCHOLESTEROL, LDLDIRECT  No results found for: TRIG      Chol/HDL Ratio 0 - 5 3.0      Cholesterol 125 - 199 mg/dL 161  TOTAL CHOLESTEROL INTERPRETATION:   Less than 200 mg/dL Desireable   200-239 mg/dL Borderline   Greater or Equal to 240 mg/dL High   LDL Calculated 0 - 100 mg/dL 76  LDL CHOLESTEROL INTERPRETATION:     Less than 100 mg/dL Optimal   100-129 mg/dL Near optimal/above optimal   130-159 mg/dL Borderline High   160-189 mg/dL High   Greater or Equal to 190 mg/dL Very High   HDL 40 - 180 mg/dL 54  HDL CHOLESTEROL INTERPRETATION:     Less than 40 mg/dL Low   Greater than 60 mg/dL Desirable   Triglycerides 0 - 149 mg/dL 156 High           Nutrition Intervention  Attend education classes related to nutrition, Complete diet survey, and Participate in an exercise program that promotes weight loss    Target Goals  Articulate heart healthy diet , Complete cardiac diet classes , Control triglyceride values , and Reduce weight     Previous goals Complete cardiac diet classes , Control blood pressure , and Reduce weight    Progress toward goal:     Education  Mr. Aquilino Royal was educated on the importance of maintaining optimal weight/BMI and nutrition guidelines.     Psychosocial Assessment  Stages of Change: Action      Psychosocial Test  Tool Used: PHQ-2/9 Today's PHQ:    PHQ Scores 2022   PHQ2 Score 0   PHQ9 Score 1     Interpretation of Total Score Depression Severity: 1-4 = Minimal depression, 5-9 = Mild depression, 10-14 = Moderate depression, 15-19 = Moderately severe depression, 20-27 = Severe depression    Reports psychosocial symptoms: no  Currently on medication therapy: no  Currently seeing a mental health professional: no  Positive support system: yes    Psychosocial Intervention  Attend education classes related to stress management and relaxation and Participate in an exercise program that improves psychosocial symptoms    Target Goals  Improve quality of live, self-efficacy and depression screening scores as measured by the appropriate tool   Previous goals Return to ADLs/desired activities  and Maximize stress management/coping skills     Progress toward goal:     Education Assessment  Stages of Change: Action  Barriers to Learning: No barriers  Personal Learning Style: Verbal  Social History     Tobacco Use   Smoking Status Former    Packs/day: 1.00    Years: 15.00    Pack years: 15.00    Types: Cigarettes    Quit date: 1958    Years since quittin.5   Smokeless Tobacco Never       Education Intervention/Learning Needs Identified  Blood pressure, Exercise, Medications, Nutrition, Risk factor for CAD, Stregnth training, Stress management and relaxation, and Weight management    Target Goals  Attend appropriate group education classes     Previous goals Articulate understanding of self-management and prevention/treatment of cardiac disease , Attend appropriate web education classes , and Restore to highest level of independent functionality    Progress toward goal:     Education  Mr. Ralph Cabrera was educated on the importance of relaxation techniques                           Provider Review and Approval of this ITP    The above treatment plan and goals have been set for your patient during Cardiac Rehabilitation. Please review and Electronically Cosign/ or Sign (if Fax Provider)            *Please comment and make changes to the EX RX or treatment plan if needed*                       Electronic Provider comment:              Please indicate with Cosign Comment.                    Electronically signed by Twana Cabot, RN on 11/7/22 at 9:27 AM EST

## 2022-11-09 ENCOUNTER — APPOINTMENT (OUTPATIENT)
Dept: CARDIAC REHAB | Age: 87
End: 2022-11-09
Payer: MEDICARE

## 2022-11-09 ENCOUNTER — HOSPITAL ENCOUNTER (OUTPATIENT)
Dept: CARDIAC REHAB | Age: 87
Setting detail: THERAPIES SERIES
Discharge: HOME | End: 2022-11-09
Payer: MEDICARE

## 2022-11-09 VITALS — BODY MASS INDEX: 27.77 KG/M2 | HEIGHT: 70 IN | WEIGHT: 194 LBS

## 2022-11-09 PROCEDURE — 93798 PHYS/QHP OP CAR RHAB W/ECG: CPT

## 2022-11-09 ASSESSMENT — EXERCISE STRESS TEST
PEAK_HR: 97
PEAK_BP: 164/60

## 2022-11-09 ASSESSMENT — PATIENT HEALTH QUESTIONNAIRE - PHQ9
3. TROUBLE FALLING OR STAYING ASLEEP: 0
5. POOR APPETITE OR OVEREATING: 0
SUM OF ALL RESPONSES TO PHQ QUESTIONS 1-9: 2
SUM OF ALL RESPONSES TO PHQ QUESTIONS 1-9: 2
7. TROUBLE CONCENTRATING ON THINGS, SUCH AS READING THE NEWSPAPER OR WATCHING TELEVISION: 0
SUM OF ALL RESPONSES TO PHQ QUESTIONS 1-9: 2
SUM OF ALL RESPONSES TO PHQ9 QUESTIONS 1 & 2: 0
6. FEELING BAD ABOUT YOURSELF - OR THAT YOU ARE A FAILURE OR HAVE LET YOURSELF OR YOUR FAMILY DOWN: 0
2. FEELING DOWN, DEPRESSED OR HOPELESS: 0
4. FEELING TIRED OR HAVING LITTLE ENERGY: 2
9. THOUGHTS THAT YOU WOULD BE BETTER OFF DEAD, OR OF HURTING YOURSELF: 0
SUM OF ALL RESPONSES TO PHQ QUESTIONS 1-9: 2
8. MOVING OR SPEAKING SO SLOWLY THAT OTHER PEOPLE COULD HAVE NOTICED. OR THE OPPOSITE, BEING SO FIGETY OR RESTLESS THAT YOU HAVE BEEN MOVING AROUND A LOT MORE THAN USUAL: 0
1. LITTLE INTEREST OR PLEASURE IN DOING THINGS: 0
10. IF YOU CHECKED OFF ANY PROBLEMS, HOW DIFFICULT HAVE THESE PROBLEMS MADE IT FOR YOU TO DO YOUR WORK, TAKE CARE OF THINGS AT HOME, OR GET ALONG WITH OTHER PEOPLE: 1

## 2022-11-09 NOTE — PLAN OF CARE
Individual Treatment Plan  Cardiac Rehabilitation    Discharge   Name: Yasmine Taylor to Rehab: 2022   : 1931 Primary Diagnosis: DANIEL    Age: 80 y.o. Referring Physician:  Bahman Colbert   MRN: [id number] Primary Care Physician: Madhav Sena MD     Allergies   Allergen Reactions    Ezetimibe Other (See Comments)     aches  aches    aches    Colestipol      aches    Statins Other (See Comments)     ALSO LIPITOR, VYTORIN       Exercise Assessment  Stages of Change: Action   Risk Stratification: Medium    Fall Risk: Low  Assistive Devices:None  See progress report below for heart rates, blood pressure, MET level and weight. Initial Assessment 6 Minute Walk Test feet:     Post 6 minute walk test:   Pre-Test Vitals: Data Measured Before Walk  Heart Rate: 80  Blood Pressure: 128/80  O2 Saturation: 98  O2 Device: Room air;       Peak Vitals: Data Measured Immediately After Walk  Distance Walked (ft): 1380 ft  Peak Heart Rate: 97  Peak Blood Pressure: 164/60  RPE: 16/20  O2 Saturation: 96  Met: 2.6       During:     Number of Rest: 0    Post-Test Vitals: Data Measured at 5 Minutes After Walk  Heart Rate: 78  Blood Pressure: 120/64  O2 Device: Room air    Post 6 Minute Walk Test feet:     Key Anti-Hypertensive Meds            amLODIPine (NORVASC) 5 MG tablet    Class: Historical Med    irbesartan (AVAPRO) 75 MG tablet    Class: Historical Med             Exercise Prescription        Mode: . Treadmill, Bike (Upright or recumbent), NuStep, SciFit, Arm Ergometer, Recumbent Eliptical, and Walking      Frequency: 2-3 days/week supervised      Intensity:RPE 11-14      Current Target Heart Rate:       Duration: 30+ minutes/day      Resistance Training: Yes, 3 days per week      Progression:Increase exercise by 0.5 METs every 1 to 2 weeks to goal of 4 to 6 METs      Supplemental oxygen:No  Max Met Level: 3.9    Plan  Home Exercise: Yes  Mode:Bike (Upright or recumbent) and golfing  Resistance Training:No    Target Goals  Achieve exercise to 3-4.9 METs , Adhere to cardiac exercise guidelines , and Adhere to independent aerobic home exercise program, [facility], fitness center   Education  Mr. Jimmy Yeung was educated on the importance of   Importance of physical activity and exercise progression and home exercise program    Nutrition Assessment  Stages of Change: Contemplation  Height: 5' 10\" (177.8 cm) Weight: 194 lb (88 kg) BMI (Calculated): 27.9     Nutrition Survey: Rate Your Plate Score: Rate Your Plate Total Score: 59     Patient Weight Goal: 194 lb   Recommended Diet:minimize processed foods, reduce sodium intake, minimize simple sugars, and increase fiber intake  Diabetic:No  Key Antihyperglycemic Medications       Patient is on no antihyperglycemic meds. Key Hyperlipidemia Meds       The patient is on no antihyperlipidemia meds. Lab Results   Component Value Date    GLUCOSE 152 (H) 2022          Nutrition Intervention  Attend education classes related to nutrition and Participate in an exercise program that promotes weight loss    Target Goals  Articulate heart healthy diet  and Maintain weight     Education  Mr. Jimmy Yeung was educated on the importance ofmaintaining optimal weight/BMI, nutrition guidelines, and cholesterol reduction    Psychosocial Assessment  Stages of Change:Action  Social History     Socioeconomic History    Marital status:      Spouse name: Not on file    Number of children: Not on file    Years of education: Not on file    Highest education level: Not on file   Occupational History     Employer: RETIRED   Tobacco Use    Smoking status: Former     Packs/day: 1.00     Years: 15.00     Pack years: 15.00     Types: Cigarettes     Quit date: 1958     Years since quittin.5    Smokeless tobacco: Never   Vaping Use    Vaping Use: Never used   Substance and Sexual Activity    Alcohol use:  Yes     Alcohol/week: 1.0 standard drink     Types: 1 Cans of beer per week    Drug use: No    Sexual activity: Not on file   Other Topics Concern    Not on file   Social History Narrative    Not on file     Social Determinants of Health     Financial Resource Strain: Not on file   Food Insecurity: Not on file   Transportation Needs: Not on file   Physical Activity: Not on file   Stress: Not on file   Social Connections: Not on file   Intimate Partner Violence: Not on file   Housing Stability: Not on file        Psychosocial Test  Tool Used:  Today's PHQ:    PHQ Scores 2022   PHQ2 Score 0 0   PHQ9 Score 2 1     Interpretation of Total Score Depression Severity: 1-4 = Minimal depression, 5-9 = Mild depression, 10-14 = Moderate depression, 15-19 = Moderately severe depression, 20-27 = Severe depression    Reports psychosocial symptoms:Yes  Currently on medication therapy:No  Currently seeing a mental health professional:No  Positive support system:Yes    Psychosocial Intervention  Attend education classes related to stress management and relaxation and Participate in an exercise program that improves psychosocial symptoms    Target Goals  Maximize stress management/coping skills     Education Assessment  Stages of Change: Action  Barriers to Learning: No barriers  Personal Learning Style:Verbal  Social History     Tobacco Use   Smoking Status Former    Packs/day: 1.00    Years: 15.00    Pack years: 15.00    Types: Cigarettes    Quit date: 1958    Years since quittin.5   Smokeless Tobacco Never       Education Intervention/Learning Needs Identified  Blood pressure, Exercise, Nutrition, Risk factor for CAD, Stress management and relaxation, and Weight management    Target Goals  Articulate understanding of self-management and prevention/treatment of cardiac disease  and Attend appropriate group education classes     Education   Addison Ortega was educated on the importance of relaxation techniques                             Provider Review and Approval of this ITP    The above treatment plan and goals have been set for your patient during Cardiac Rehabilitation. Please review and Electronically Cosign/ or Sign (if Fax Provider)            *Please comment and make changes to the EX RX or treatment plan if needed*                       Electronic Provider comment:              Please indicate with Cosign Comment.                    Electronically signed by Blair Vang RN on 11/9/22 at 11:38 AM EST

## 2022-11-11 ENCOUNTER — APPOINTMENT (OUTPATIENT)
Dept: CARDIAC REHAB | Age: 87
End: 2022-11-11
Payer: MEDICARE

## 2022-11-14 ENCOUNTER — APPOINTMENT (OUTPATIENT)
Dept: CARDIAC REHAB | Age: 87
End: 2022-11-14
Payer: MEDICARE

## 2022-11-16 ENCOUNTER — APPOINTMENT (OUTPATIENT)
Dept: CARDIAC REHAB | Age: 87
End: 2022-11-16
Payer: MEDICARE

## 2022-11-18 ENCOUNTER — APPOINTMENT (OUTPATIENT)
Dept: CARDIAC REHAB | Age: 87
End: 2022-11-18
Payer: MEDICARE

## 2022-11-21 ENCOUNTER — APPOINTMENT (OUTPATIENT)
Dept: CARDIAC REHAB | Age: 87
End: 2022-11-21
Payer: MEDICARE

## 2022-11-23 ENCOUNTER — APPOINTMENT (OUTPATIENT)
Dept: CARDIAC REHAB | Age: 87
End: 2022-11-23
Payer: MEDICARE

## 2022-11-25 ENCOUNTER — APPOINTMENT (OUTPATIENT)
Dept: CARDIAC REHAB | Age: 87
End: 2022-11-25
Payer: MEDICARE

## 2022-11-28 ENCOUNTER — APPOINTMENT (OUTPATIENT)
Dept: CARDIAC REHAB | Age: 87
End: 2022-11-28
Payer: MEDICARE

## 2022-11-30 ENCOUNTER — APPOINTMENT (OUTPATIENT)
Dept: CARDIAC REHAB | Age: 87
End: 2022-11-30
Payer: MEDICARE

## 2024-06-26 ENCOUNTER — APPOINTMENT (OUTPATIENT)
Dept: CT IMAGING | Age: 89
End: 2024-06-26
Payer: MEDICARE

## 2024-06-26 ENCOUNTER — APPOINTMENT (OUTPATIENT)
Dept: GENERAL RADIOLOGY | Age: 89
End: 2024-06-26
Payer: MEDICARE

## 2024-06-26 ENCOUNTER — HOSPITAL ENCOUNTER (EMERGENCY)
Age: 89
Discharge: ANOTHER HOSPITAL | End: 2024-06-27
Attending: STUDENT IN AN ORGANIZED HEALTH CARE EDUCATION/TRAINING PROGRAM
Payer: MEDICARE

## 2024-06-26 DIAGNOSIS — F41.9 ANXIETY: ICD-10-CM

## 2024-06-26 DIAGNOSIS — R41.0 TRANSIENT CONFUSION: Primary | ICD-10-CM

## 2024-06-26 LAB
ALBUMIN SERPL-MCNC: 3.6 G/DL (ref 3.4–5)
ALBUMIN/GLOB SERPL: 1.1 {RATIO} (ref 1.1–2.2)
ALP SERPL-CCNC: 104 U/L (ref 40–129)
ALT SERPL-CCNC: 14 U/L (ref 10–40)
ANION GAP SERPL CALCULATED.3IONS-SCNC: 13 MMOL/L (ref 3–16)
AST SERPL-CCNC: 21 U/L (ref 15–37)
BASOPHILS # BLD: 0.1 K/UL (ref 0–0.2)
BASOPHILS NFR BLD: 1.2 %
BILIRUB SERPL-MCNC: 0.4 MG/DL (ref 0–1)
BUN SERPL-MCNC: 23 MG/DL (ref 7–20)
CALCIUM SERPL-MCNC: 9.2 MG/DL (ref 8.3–10.6)
CHLORIDE SERPL-SCNC: 103 MMOL/L (ref 99–110)
CO2 SERPL-SCNC: 22 MMOL/L (ref 21–32)
CREAT SERPL-MCNC: 1.2 MG/DL (ref 0.8–1.3)
DEPRECATED RDW RBC AUTO: 14.6 % (ref 12.4–15.4)
EOSINOPHIL # BLD: 0 K/UL (ref 0–0.6)
EOSINOPHIL NFR BLD: 0.7 %
GFR SERPLBLD CREATININE-BSD FMLA CKD-EPI: 56 ML/MIN/{1.73_M2}
GLUCOSE SERPL-MCNC: 172 MG/DL (ref 70–99)
HCT VFR BLD AUTO: 39 % (ref 40.5–52.5)
HGB BLD-MCNC: 13.3 G/DL (ref 13.5–17.5)
INR PPP: 1.11 (ref 0.85–1.15)
LYMPHOCYTES # BLD: 0.8 K/UL (ref 1–5.1)
LYMPHOCYTES NFR BLD: 14.2 %
MCH RBC QN AUTO: 31.3 PG (ref 26–34)
MCHC RBC AUTO-ENTMCNC: 34 G/DL (ref 31–36)
MCV RBC AUTO: 92.2 FL (ref 80–100)
MONOCYTES # BLD: 0.6 K/UL (ref 0–1.3)
MONOCYTES NFR BLD: 10.2 %
NEUTROPHILS # BLD: 4.1 K/UL (ref 1.7–7.7)
NEUTROPHILS NFR BLD: 73.7 %
PLATELET # BLD AUTO: 190 K/UL (ref 135–450)
PMV BLD AUTO: 8.9 FL (ref 5–10.5)
POTASSIUM SERPL-SCNC: 3.9 MMOL/L (ref 3.5–5.1)
PROT SERPL-MCNC: 6.8 G/DL (ref 6.4–8.2)
PROTHROMBIN TIME: 14.5 SEC (ref 11.9–14.9)
RBC # BLD AUTO: 4.23 M/UL (ref 4.2–5.9)
SODIUM SERPL-SCNC: 138 MMOL/L (ref 136–145)
TROPONIN, HIGH SENSITIVITY: 15 NG/L (ref 0–22)
TROPONIN, HIGH SENSITIVITY: 19 NG/L (ref 0–22)
WBC # BLD AUTO: 5.6 K/UL (ref 4–11)

## 2024-06-26 PROCEDURE — 85610 PROTHROMBIN TIME: CPT

## 2024-06-26 PROCEDURE — 85025 COMPLETE CBC W/AUTO DIFF WBC: CPT

## 2024-06-26 PROCEDURE — 80053 COMPREHEN METABOLIC PANEL: CPT

## 2024-06-26 PROCEDURE — 6360000004 HC RX CONTRAST MEDICATION: Performed by: REGISTERED NURSE

## 2024-06-26 PROCEDURE — 99285 EMERGENCY DEPT VISIT HI MDM: CPT

## 2024-06-26 PROCEDURE — 70450 CT HEAD/BRAIN W/O DYE: CPT

## 2024-06-26 PROCEDURE — 93005 ELECTROCARDIOGRAM TRACING: CPT | Performed by: STUDENT IN AN ORGANIZED HEALTH CARE EDUCATION/TRAINING PROGRAM

## 2024-06-26 PROCEDURE — 70498 CT ANGIOGRAPHY NECK: CPT

## 2024-06-26 PROCEDURE — 71046 X-RAY EXAM CHEST 2 VIEWS: CPT

## 2024-06-26 PROCEDURE — 36415 COLL VENOUS BLD VENIPUNCTURE: CPT

## 2024-06-26 PROCEDURE — 6370000000 HC RX 637 (ALT 250 FOR IP): Performed by: REGISTERED NURSE

## 2024-06-26 PROCEDURE — 84484 ASSAY OF TROPONIN QUANT: CPT

## 2024-06-26 RX ORDER — HYDROXYZINE HYDROCHLORIDE 25 MG/1
25 TABLET, FILM COATED ORAL ONCE
Status: COMPLETED | OUTPATIENT
Start: 2024-06-26 | End: 2024-06-26

## 2024-06-26 RX ADMIN — HYDROXYZINE HYDROCHLORIDE 25 MG: 25 TABLET ORAL at 23:53

## 2024-06-26 RX ADMIN — IOPAMIDOL 85 ML: 755 INJECTION, SOLUTION INTRAVENOUS at 23:02

## 2024-06-26 ASSESSMENT — LIFESTYLE VARIABLES
HOW OFTEN DO YOU HAVE A DRINK CONTAINING ALCOHOL: NEVER
HOW MANY STANDARD DRINKS CONTAINING ALCOHOL DO YOU HAVE ON A TYPICAL DAY: PATIENT DOES NOT DRINK

## 2024-06-26 ASSESSMENT — PAIN - FUNCTIONAL ASSESSMENT: PAIN_FUNCTIONAL_ASSESSMENT: NONE - DENIES PAIN

## 2024-06-27 ENCOUNTER — APPOINTMENT (OUTPATIENT)
Dept: MRI IMAGING | Age: 89
DRG: 071 | End: 2024-06-27
Attending: INTERNAL MEDICINE
Payer: MEDICARE

## 2024-06-27 ENCOUNTER — HOSPITAL ENCOUNTER (INPATIENT)
Age: 89
LOS: 1 days | Discharge: HOME | DRG: 071 | End: 2024-06-28
Attending: INTERNAL MEDICINE | Admitting: INTERNAL MEDICINE
Payer: MEDICARE

## 2024-06-27 VITALS
OXYGEN SATURATION: 93 % | DIASTOLIC BLOOD PRESSURE: 64 MMHG | SYSTOLIC BLOOD PRESSURE: 143 MMHG | RESPIRATION RATE: 16 BRPM | HEIGHT: 70 IN | WEIGHT: 180 LBS | TEMPERATURE: 97.9 F | BODY MASS INDEX: 25.77 KG/M2 | HEART RATE: 78 BPM

## 2024-06-27 DIAGNOSIS — R40.4 TRANSIENT ALTERATION OF AWARENESS: Primary | ICD-10-CM

## 2024-06-27 PROBLEM — I63.9 STROKE, ACUTE, THROMBOTIC (HCC): Status: ACTIVE | Noted: 2024-06-27

## 2024-06-27 LAB
EKG ATRIAL RATE: 68 BPM
EKG DIAGNOSIS: NORMAL
EKG P AXIS: 53 DEGREES
EKG P-R INTERVAL: 202 MS
EKG Q-T INTERVAL: 398 MS
EKG QRS DURATION: 82 MS
EKG QTC CALCULATION (BAZETT): 423 MS
EKG R AXIS: -21 DEGREES
EKG T AXIS: 48 DEGREES
EKG VENTRICULAR RATE: 68 BPM
TSH SERPL DL<=0.005 MIU/L-ACNC: 1.6 UIU/ML (ref 0.27–4.2)

## 2024-06-27 PROCEDURE — 95819 EEG AWAKE AND ASLEEP: CPT

## 2024-06-27 PROCEDURE — 82607 VITAMIN B-12: CPT

## 2024-06-27 PROCEDURE — 97161 PT EVAL LOW COMPLEX 20 MIN: CPT

## 2024-06-27 PROCEDURE — 99223 1ST HOSP IP/OBS HIGH 75: CPT | Performed by: PSYCHIATRY & NEUROLOGY

## 2024-06-27 PROCEDURE — 6370000000 HC RX 637 (ALT 250 FOR IP): Performed by: INTERNAL MEDICINE

## 2024-06-27 PROCEDURE — 93010 ELECTROCARDIOGRAM REPORT: CPT | Performed by: INTERNAL MEDICINE

## 2024-06-27 PROCEDURE — 97165 OT EVAL LOW COMPLEX 30 MIN: CPT

## 2024-06-27 PROCEDURE — 92526 ORAL FUNCTION THERAPY: CPT

## 2024-06-27 PROCEDURE — G0379 DIRECT REFER HOSPITAL OBSERV: HCPCS

## 2024-06-27 PROCEDURE — 6370000000 HC RX 637 (ALT 250 FOR IP): Performed by: NURSE PRACTITIONER

## 2024-06-27 PROCEDURE — 6370000000 HC RX 637 (ALT 250 FOR IP): Performed by: REGISTERED NURSE

## 2024-06-27 PROCEDURE — 94640 AIRWAY INHALATION TREATMENT: CPT

## 2024-06-27 PROCEDURE — 36415 COLL VENOUS BLD VENIPUNCTURE: CPT

## 2024-06-27 PROCEDURE — 92610 EVALUATE SWALLOWING FUNCTION: CPT

## 2024-06-27 PROCEDURE — 6360000002 HC RX W HCPCS: Performed by: INTERNAL MEDICINE

## 2024-06-27 PROCEDURE — 96372 THER/PROPH/DIAG INJ SC/IM: CPT

## 2024-06-27 PROCEDURE — 2580000003 HC RX 258: Performed by: INTERNAL MEDICINE

## 2024-06-27 PROCEDURE — 97530 THERAPEUTIC ACTIVITIES: CPT

## 2024-06-27 PROCEDURE — APPNB60 APP NON BILLABLE TIME 46-60 MINS: Performed by: NURSE PRACTITIONER

## 2024-06-27 PROCEDURE — 97116 GAIT TRAINING THERAPY: CPT

## 2024-06-27 PROCEDURE — 1200000000 HC SEMI PRIVATE

## 2024-06-27 PROCEDURE — G0378 HOSPITAL OBSERVATION PER HR: HCPCS

## 2024-06-27 PROCEDURE — 84443 ASSAY THYROID STIM HORMONE: CPT

## 2024-06-27 PROCEDURE — 70551 MRI BRAIN STEM W/O DYE: CPT

## 2024-06-27 RX ORDER — LEVETIRACETAM 500 MG/1
500 TABLET ORAL 2 TIMES DAILY
Status: DISCONTINUED | OUTPATIENT
Start: 2024-06-27 | End: 2024-06-28 | Stop reason: HOSPADM

## 2024-06-27 RX ORDER — FINASTERIDE 5 MG/1
5 TABLET, FILM COATED ORAL DAILY
Status: DISCONTINUED | OUTPATIENT
Start: 2024-06-27 | End: 2024-06-28 | Stop reason: HOSPADM

## 2024-06-27 RX ORDER — BUDESONIDE 0.5 MG/2ML
0.5 INHALANT ORAL 2 TIMES DAILY
Status: DISCONTINUED | OUTPATIENT
Start: 2024-06-27 | End: 2024-06-28 | Stop reason: HOSPADM

## 2024-06-27 RX ORDER — ACETAMINOPHEN 325 MG/1
650 TABLET ORAL EVERY 6 HOURS PRN
Status: DISCONTINUED | OUTPATIENT
Start: 2024-06-27 | End: 2024-06-28 | Stop reason: HOSPADM

## 2024-06-27 RX ORDER — POTASSIUM CHLORIDE 20 MEQ/1
40 TABLET, EXTENDED RELEASE ORAL PRN
Status: DISCONTINUED | OUTPATIENT
Start: 2024-06-27 | End: 2024-06-28 | Stop reason: HOSPADM

## 2024-06-27 RX ORDER — ACETAMINOPHEN 650 MG/1
650 SUPPOSITORY RECTAL EVERY 6 HOURS PRN
Status: DISCONTINUED | OUTPATIENT
Start: 2024-06-27 | End: 2024-06-28 | Stop reason: HOSPADM

## 2024-06-27 RX ORDER — FLUTICASONE FUROATE, UMECLIDINIUM BROMIDE AND VILANTEROL TRIFENATATE 100; 62.5; 25 UG/1; UG/1; UG/1
1 POWDER RESPIRATORY (INHALATION) DAILY
COMMUNITY

## 2024-06-27 RX ORDER — ALLOPURINOL 100 MG/1
100 TABLET ORAL DAILY
Status: DISCONTINUED | OUTPATIENT
Start: 2024-06-27 | End: 2024-06-28 | Stop reason: HOSPADM

## 2024-06-27 RX ORDER — ONDANSETRON 4 MG/1
4 TABLET, ORALLY DISINTEGRATING ORAL EVERY 8 HOURS PRN
Status: DISCONTINUED | OUTPATIENT
Start: 2024-06-27 | End: 2024-06-28 | Stop reason: HOSPADM

## 2024-06-27 RX ORDER — POTASSIUM CHLORIDE 7.45 MG/ML
10 INJECTION INTRAVENOUS PRN
Status: DISCONTINUED | OUTPATIENT
Start: 2024-06-27 | End: 2024-06-28 | Stop reason: HOSPADM

## 2024-06-27 RX ORDER — ONDANSETRON 2 MG/ML
4 INJECTION INTRAMUSCULAR; INTRAVENOUS EVERY 6 HOURS PRN
Status: DISCONTINUED | OUTPATIENT
Start: 2024-06-27 | End: 2024-06-28 | Stop reason: HOSPADM

## 2024-06-27 RX ORDER — ASPIRIN 81 MG/1
81 TABLET ORAL DAILY
Status: DISCONTINUED | OUTPATIENT
Start: 2024-06-28 | End: 2024-06-27

## 2024-06-27 RX ORDER — ARFORMOTEROL TARTRATE 15 UG/2ML
15 SOLUTION RESPIRATORY (INHALATION)
Status: DISCONTINUED | OUTPATIENT
Start: 2024-06-27 | End: 2024-06-28 | Stop reason: HOSPADM

## 2024-06-27 RX ORDER — POLYETHYLENE GLYCOL 3350 17 G/17G
17 POWDER, FOR SOLUTION ORAL DAILY PRN
Status: DISCONTINUED | OUTPATIENT
Start: 2024-06-27 | End: 2024-06-28 | Stop reason: HOSPADM

## 2024-06-27 RX ORDER — SODIUM CHLORIDE 0.9 % (FLUSH) 0.9 %
5-40 SYRINGE (ML) INJECTION EVERY 12 HOURS SCHEDULED
Status: DISCONTINUED | OUTPATIENT
Start: 2024-06-27 | End: 2024-06-28 | Stop reason: HOSPADM

## 2024-06-27 RX ORDER — DORZOLAMIDE HYDROCHLORIDE AND TIMOLOL MALEATE 20; 5 MG/ML; MG/ML
1 SOLUTION/ DROPS OPHTHALMIC 2 TIMES DAILY
COMMUNITY

## 2024-06-27 RX ORDER — SODIUM CHLORIDE 9 MG/ML
INJECTION, SOLUTION INTRAVENOUS PRN
Status: DISCONTINUED | OUTPATIENT
Start: 2024-06-27 | End: 2024-06-28 | Stop reason: HOSPADM

## 2024-06-27 RX ORDER — ENOXAPARIN SODIUM 100 MG/ML
40 INJECTION SUBCUTANEOUS DAILY
Status: DISCONTINUED | OUTPATIENT
Start: 2024-06-27 | End: 2024-06-27

## 2024-06-27 RX ORDER — AMLODIPINE BESYLATE 2.5 MG/1
2.5 TABLET ORAL DAILY
Status: DISCONTINUED | OUTPATIENT
Start: 2024-06-27 | End: 2024-06-28 | Stop reason: HOSPADM

## 2024-06-27 RX ORDER — SODIUM CHLORIDE 0.9 % (FLUSH) 0.9 %
5-40 SYRINGE (ML) INJECTION PRN
Status: DISCONTINUED | OUTPATIENT
Start: 2024-06-27 | End: 2024-06-28 | Stop reason: HOSPADM

## 2024-06-27 RX ORDER — ASPIRIN 325 MG
325 TABLET ORAL ONCE
Status: COMPLETED | OUTPATIENT
Start: 2024-06-27 | End: 2024-06-27

## 2024-06-27 RX ORDER — CLOPIDOGREL BISULFATE 75 MG/1
75 TABLET ORAL DAILY
Status: DISCONTINUED | OUTPATIENT
Start: 2024-06-27 | End: 2024-06-28 | Stop reason: HOSPADM

## 2024-06-27 RX ORDER — DORZOLAMIDE HYDROCHLORIDE AND TIMOLOL MALEATE 20; 5 MG/ML; MG/ML
1 SOLUTION/ DROPS OPHTHALMIC 2 TIMES DAILY
Status: DISCONTINUED | OUTPATIENT
Start: 2024-06-27 | End: 2024-06-28 | Stop reason: HOSPADM

## 2024-06-27 RX ORDER — LATANOPROST 50 UG/ML
1 SOLUTION/ DROPS OPHTHALMIC NIGHTLY
Status: DISCONTINUED | OUTPATIENT
Start: 2024-06-27 | End: 2024-06-28 | Stop reason: HOSPADM

## 2024-06-27 RX ORDER — MAGNESIUM SULFATE IN WATER 40 MG/ML
2000 INJECTION, SOLUTION INTRAVENOUS PRN
Status: DISCONTINUED | OUTPATIENT
Start: 2024-06-27 | End: 2024-06-28 | Stop reason: HOSPADM

## 2024-06-27 RX ORDER — GABAPENTIN 300 MG/1
300 CAPSULE ORAL 4 TIMES DAILY
Status: ON HOLD | COMMUNITY
End: 2024-06-27 | Stop reason: CLARIF

## 2024-06-27 RX ORDER — AMLODIPINE BESYLATE 2.5 MG/1
2.5 TABLET ORAL DAILY
COMMUNITY

## 2024-06-27 RX ORDER — LOSARTAN POTASSIUM 50 MG/1
50 TABLET ORAL DAILY
Status: DISCONTINUED | OUTPATIENT
Start: 2024-06-27 | End: 2024-06-28 | Stop reason: HOSPADM

## 2024-06-27 RX ORDER — LATANOPROST 50 UG/ML
1 SOLUTION/ DROPS OPHTHALMIC NIGHTLY
COMMUNITY

## 2024-06-27 RX ORDER — IRBESARTAN 150 MG/1
150 TABLET ORAL DAILY
COMMUNITY

## 2024-06-27 RX ORDER — HYDRALAZINE HYDROCHLORIDE 20 MG/ML
10 INJECTION INTRAMUSCULAR; INTRAVENOUS EVERY 6 HOURS PRN
Status: DISCONTINUED | OUTPATIENT
Start: 2024-06-27 | End: 2024-06-28 | Stop reason: HOSPADM

## 2024-06-27 RX ORDER — GABAPENTIN 300 MG/1
300 CAPSULE ORAL 2 TIMES DAILY
Status: DISCONTINUED | OUTPATIENT
Start: 2024-06-27 | End: 2024-06-28 | Stop reason: HOSPADM

## 2024-06-27 RX ORDER — GABAPENTIN 300 MG/1
300 CAPSULE ORAL 2 TIMES DAILY
COMMUNITY

## 2024-06-27 RX ADMIN — ACETAMINOPHEN 650 MG: 325 TABLET ORAL at 23:21

## 2024-06-27 RX ADMIN — ASPIRIN 325 MG: 325 TABLET ORAL at 00:56

## 2024-06-27 RX ADMIN — APIXABAN 2.5 MG: 2.5 TABLET, FILM COATED ORAL at 20:41

## 2024-06-27 RX ADMIN — ALLOPURINOL 100 MG: 100 TABLET ORAL at 12:41

## 2024-06-27 RX ADMIN — SODIUM CHLORIDE, PRESERVATIVE FREE 10 ML: 5 INJECTION INTRAVENOUS at 10:05

## 2024-06-27 RX ADMIN — AMLODIPINE BESYLATE 2.5 MG: 2.5 TABLET ORAL at 12:41

## 2024-06-27 RX ADMIN — ARFORMOTEROL TARTRATE 15 MCG: 15 SOLUTION RESPIRATORY (INHALATION) at 19:32

## 2024-06-27 RX ADMIN — GABAPENTIN 300 MG: 300 CAPSULE ORAL at 20:41

## 2024-06-27 RX ADMIN — FINASTERIDE 5 MG: 5 TABLET, FILM COATED ORAL at 12:41

## 2024-06-27 RX ADMIN — DORZOLAMIDE HYDROCHLORIDE AND TIMOLOL MALEATE 1 DROP: 20; 5 SOLUTION/ DROPS OPHTHALMIC at 22:11

## 2024-06-27 RX ADMIN — LATANOPROST 1 DROP: 50 SOLUTION OPHTHALMIC at 22:11

## 2024-06-27 RX ADMIN — BUDESONIDE 500 MCG: 0.5 SUSPENSION RESPIRATORY (INHALATION) at 19:32

## 2024-06-27 RX ADMIN — ENOXAPARIN SODIUM 40 MG: 100 INJECTION SUBCUTANEOUS at 10:44

## 2024-06-27 RX ADMIN — LOSARTAN POTASSIUM 50 MG: 50 TABLET, FILM COATED ORAL at 12:41

## 2024-06-27 RX ADMIN — GABAPENTIN 300 MG: 300 CAPSULE ORAL at 12:41

## 2024-06-27 RX ADMIN — CLOPIDOGREL BISULFATE 75 MG: 75 TABLET ORAL at 15:45

## 2024-06-27 RX ADMIN — DORZOLAMIDE HYDROCHLORIDE AND TIMOLOL MALEATE 1 DROP: 20; 5 SOLUTION/ DROPS OPHTHALMIC at 12:41

## 2024-06-27 ASSESSMENT — ENCOUNTER SYMPTOMS
SHORTNESS OF BREATH: 0
ABDOMINAL PAIN: 0
CHEST TIGHTNESS: 0

## 2024-06-27 ASSESSMENT — PAIN SCALES - GENERAL
PAINLEVEL_OUTOF10: 0
PAINLEVEL_OUTOF10: 0

## 2024-06-27 ASSESSMENT — PAIN - FUNCTIONAL ASSESSMENT: PAIN_FUNCTIONAL_ASSESSMENT: NONE - DENIES PAIN

## 2024-06-27 NOTE — PROGRESS NOTES
Speech pathology  Attempt    Order received, chart reviewed, pt off floor a this time for MRI. Will follow up as pt able and schedule allows.      GHAZAL ZAZUETA M.S./CCC-SLP #[id number]  Pg. # [phone]

## 2024-06-27 NOTE — CONSULTS
Pharmacy Consult Note  - Admission Medication Reconciliation      Pharmacy consulted for reconciliation of oylif-da-ylbcfezzp medications.   I reviewed Rx fill history via \"Complete Dispense Report\" in Epic, and spoke to patient 's daughter, Dianne on phone    Per Dianne, pt's wife has early dementia and is recovering from recent injury in a SNF. Pt has been living alone about 2 weeks.  Pt manages his own medications at home.    Per RN, pt is confused today.   Did not review medications with patient, and daughter unable to provide info.  I based med list updates below based on Rx fill history (complete dispense report aka Surescripts), and med lists in Care Everywhere       The following changes made to aiiel-cn-hzwncqkne medication list:    ADDED:  1) Eliquis 2.5 mg BID   (filled 5 mg tabs #90/90-day on 3/4/24)  2) Dorzolamide/timolol 2/0.5%, 1 drop in each eye BID   (filled 6/6 x90-day)  3) Latanoprost 0.005% - 1 drop in each eye nigthly (filled 5/11 x 90-day)  4) Trelegy 100-62.5-25 - 1 inhalation daily       Dose or Frequency CHANGE:  1) Amlodipine -- changed to 2.5 mg BID  (filled 5/16 x90) - appears was decreased 2/2024 from previous dosing of 10 mg  2) Gabapentin - changed to 300 mg BID (not 4x daily)- filled 6/23 #180/90-day  3) Irbesartan -- changed to 150 mg daily (not 75 mg) - filled 4/27 x #90/90-day    REMOVED: (OTCs, could not confirm dispenses)  1) Calcium  2) Capsicum  3) B-12  4) Fish oil  5) Magnesium  6) Lidocaine patch  7) omeprazole  8) Red yeast  9) Garlic  10) Zinc  11) Ubiquinol (CoQ10)         Current Outpatient Medications   Medication Instructions    allopurinol (ZYLOPRIM) 100 mg, Oral, DAILY    amLODIPine (NORVASC) 2.5 mg, Oral, DAILY    apixaban (ELIQUIS) 2.5 mg, Oral, 2 TIMES DAILY, Fills 5 mg tabs.  Dosing is 2.5 mg BID    dorzolamide-timolol (COSOPT) 2-0.5 % ophthalmic solution 1 drop, Both Eyes, 2 TIMES DAILY    finasteride (PROSCAR) 5 mg, Oral, DAILY

## 2024-06-27 NOTE — H&P
V2.0  History and Physical      Name:  Cj Whitaker /Age/Sex: 1931  (89 y.o. male)   MRN & CSN:  [id number] & [id number] Encounter Date/Time: 2024 10:04 AM EDT   Location:  [address] [place] [address] PCP: Chavez Valentin MD       Hospital Day: 1    Assessment and Plan:       Assessment/plan:    Acute memory loss/amnesia:  Case reviewed with [place] emergency provider admit to telemetry inpatient  Consult neurology Case discussed with neurology at length  MRI brain ordered and reviewed by me shows no findings concerning for acute stroke  Concern for possible seizure versus markedly elevated blood pressure and encephalopathy secondary to that  EEG ordered  Keppra 500 mg twice daily started after EEG    Hypertension:  Patient's home blood pressure medication has been initiated    Atrial fibrillation:  Currently not on any chronic anticoagulation  EKG reviewed from yesterday shows sinus rhythm without acute ischemic changes as reviewed by me      Other chronic conditions:  Aortic stenosis status post valve aortic transcatheter replacement  2022  Coronary disease status post stent 2019  Asthma  Carotid artery  COPD without exacerbation  GERD  Lumbar spinal stenosis  Hyperlipidemia            Disposition:       Diet ADULT DIET; Regular   DVT Prophylaxis [] Lovenox, []  Heparin, [] SCDs, [] Ambulation,  [] Eliquis, [] Xarelto, [] Coumadin   Code Status Full Code   Surrogate Decision Maker/ POA Daughter     Personally reviewed Lab Studies and Imaging             History from:     patient, ER note and reviewing records from office visit 2024 as patient is confused    History of Present Illness:     Chief Complaint: Memory loss       HPI: is a 89 y.o. male with pmh of PVD, aortic stenosis, asthma, atrial fibrillation, CAD, COPD, DVT, carotid stenosis, hypertension, hyperlipidemia, chronic back pain, JUSTINE and history of aortic transcatheter replacement with UMANG(2022) as well as coronary angiogram with  cyst     Bilateral carotid artery stenosis     CAD (coronary artery disease)     Cancer (HCC)     Left cheek and right ear    Chronic kidney disease, stage 3a (HCC)     COPD (chronic obstructive pulmonary disease) (HCC)     GERD (gastroesophageal reflux disease)     Hearing loss     Heart murmur     Hx of blood clots     Hyperlipidemia     Hyperlipidemia     Hypertension     Kidney stones     Neuropathy     Nonrheumatic aortic (valve) stenosis     Pancreatic lesion      PSHX:  has a past surgical history that includes Appendectomy; Tonsillectomy; Carpal tunnel release; knee surgery; shoulder surgery; eye surgery (2012); and Cardiac surgery (2022).  Allergies:   Allergies   Allergen Reactions    Ezetimibe Other (See Comments)     aches  aches    aches    Colestipol      aches    Statins Other (See Comments)     ALSO LIPITOR, VYTORIN     Fam HX:  family history includes Heart Disease in his mother.  Soc HX:   Social History     Socioeconomic History    Marital status:    Occupational History     Employer: RETIRED   Tobacco Use    Smoking status: Former     Current packs/day: 0.00     Average packs/day: 1 pack/day for 15.0 years (15.0 ttl pk-yrs)     Types: Cigarettes     Start date: 1943     Quit date: 1958     Years since quittin.1    Smokeless tobacco: Never   Vaping Use    Vaping Use: Never used   Substance and Sexual Activity    Alcohol use: Yes     Alcohol/week: 1.0 standard drink of alcohol     Types: 1 Cans of beer per week    Drug use: No       Medications:   Medications:    sodium chloride flush  5-40 mL IntraVENous 2 times per day    enoxaparin  40 mg SubCUTAneous Daily      Infusions:    sodium chloride       PRN Meds: sodium chloride flush, 5-40 mL, PRN  sodium chloride, , PRN  potassium chloride, 40 mEq, PRN   Or  potassium alternative oral replacement, 40 mEq, PRN   Or  potassium chloride, 10 mEq, PRN  magnesium sulfate, 2,000 mg, PRN  ondansetron, 4 mg, Q8H PRN

## 2024-06-27 NOTE — ED NOTES
Pt having \"spells\" where he starts breathing real fast, will follow commands but is \"somewhat out of it\".  Will look at you when you tell him too and will close lips when told but continues to breathe fast.  Lasts approx 1 minute and then goes away.  Dr Varma informed and does not fell a code stroke needs to be implemented, requested a cardiac workup.

## 2024-06-27 NOTE — ED NOTES
Patient going to bed 4310 at [facility]. Call report to [phone]. [facility] ETA [id number].

## 2024-06-27 NOTE — ED NOTES
Donna Cornejo called code Stroke at [phone]. Stroke team paged. Ct notified and Code Stroke paged over head. Stroke team returned call at 2300.

## 2024-06-27 NOTE — PROGRESS NOTES
kristopher crackers and thin liquids via cup / straw, including 3 ounces of uninterrupted swallows.  Pt demonstrated no overt signs of aspiration: no coughing/throat clearing or change in vocal quality.  Swallow movement noted upon palpation of anterior neck. Adequate labial seal noted with no anterior loss of liquids.  Pt exhibited no difficulty with mastication of cracker, no oral residue.   Recommend cont regular diet/thin liquids     Instrumental assessment results - not indicated    Speech, Language, Cognitive screen 6/27/24  Speech is intelligible, no dysarthria or aphasia noted. Pt followed directions, answered yes/no questions and provided biographical information accurately.   Pt reports only concern is he has no memory of what happened to him that brought him to hospital.  MRI is negative for acute abnormality    Prognosis:  Prognosis for improvement: fair- good  Barriers to reach goals: advanced age    Treatment:  Dysphagia Goals:  The pt will be seen  to address the following goals:   Goal 1: Patient will tolerate least restrictive diet with adequate oral prep and without overt signs of aspiration or associated decline in respiratory status.  Goal 2: Patient/caregiver will demonstrate understanding of dysphagia recommendations/concerns.  6/27: Educated pt to purpose of visit, s/s of aspiration, concern if aspiration occurs and rationale for diet recommendation/strategies to reduce risk for aspiration. Pt instructed to notify staff if any signs of aspiration emerge or dysphagia concerns. Pt stated comprehension   Cont goal      Speech Goals:  TBD if indicated    Education  As above    Pt goal: denies dysphagia or speech impairments  Pt discharge goal: asking if he can go home    Total treatment time: 14 dys eval 9 treat    Plan: Continue per POC  Recommended Diet: cont regular diet  Medication administration: whole with water    Strategies:   Upright all meals    Discharge Recommendation: TBD  Discussed with

## 2024-06-27 NOTE — DISCHARGE INSTRUCTIONS
Seizure Driving Risk: Having a Seizure while driving puts you at risk for injuring yourself or others. If you drive while having uncontrolled seizures, you may be held liable for injuries to others. Do not drive until you have been seizure free for at least 3 months, state laws vary so please check laws in your state. Injury Risk: Please avoid working from tall heights, swimming alone or taking baths in a bathtub, use showers only.       Follow up primary care provider in 1 week for management of blood pressure.

## 2024-06-27 NOTE — PROGRESS NOTES
Plan  General Plan:  (2-5)  Current Treatment Recommendations: Balance training, Transfer training, Gait training, Endurance training, Therapeutic activities, Safety education & training  Safety Devices  Type of Devices: Gait belt, Nurse notified, Left in bed (left on stretcher w/ transporter assist, RN aware)     Restrictions  Position Activity Restriction  Other position/activity restrictions: up with assist     Subjective   Pain: denies c/o pain  General  Chart Reviewed: Yes  Additional Pertinent Hx: 89 y.o. y/o male with history significant for atrial fibrillation, CAD, critical left ICA carotid stenosis (on Plavix at home), severe ICAD, HTN who presents with transient amnesia.  Family / Caregiver Present: Yes (2 daughters)  Referring Practitioner: Trey Lazaro MD  Diagnosis: transient amnesia  Follows Commands: Within Functional Limits  Subjective  Subjective: Pt found supine in bed upon arrival, denying pain and agreeable to therapy.         Social/Functional History  Social/Functional History  Lives With: Spouse  Type of Home: Apartment  Home Layout: One level  Home Access: Level entry  Bathroom Shower/Tub: Walk-in shower  Bathroom Toilet: Handicap height  Bathroom Equipment: Grab bars in shower, Shower chair, Grab bars around toilet  Bathroom Accessibility: Accessible  Home Equipment: Cane  Has the patient had two or more falls in the past year or any fall with injury in the past year?: No  ADL Assistance: Independent  Homemaking Assistance: Independent  Ambulation Assistance: Independent  Transfer Assistance: Independent  Active : Yes  Occupation: Retired  Additional Comments: Wife is currently in a rehab facility. 2 daughters able to assist at d/c.  Vision/Hearing  Vision  Vision: Impaired  Vision Exceptions: Wears glasses at all times  Hearing  Hearing: Exceptions to WFL  Hearing Exceptions: Bilateral hearing aid    Cognition   Orientation  Overall Orientation Status: Within Normal  Limits  Orientation Level: Oriented X4  Cognition  Overall Cognitive Status: WNL     Objective   Temp: 97.8 °F (36.6 °C)  Pulse: 72  Heart Rate Source: Monitor  Respirations: 18  SpO2: 94 %  O2 Device: None (Room air)  BP: (!) 174/73  MAP (Calculated): 107              AROM RLE (degrees)  RLE AROM: WFL  AROM LLE (degrees)  LLE AROM : WFL  Strength RLE  Strength RLE: WFL  Strength LLE  Strength LLE: WFL        Balance  Sitting: Intact  Standing:  (SBA)  Bed mobility  Supine to Sit: Stand by assistance  Scooting: Stand by assistance  Transfers  Sit to Stand: Stand by assistance  Stand to Sit: Stand by assistance  Ambulation  Surface: Level tile  Device: No Device  Assistance: Stand by assistance  Quality of Gait: moderate evelyn, stride length and Nelia. Overall steady with no LOB or near LOB.  Distance: 10'+50'  Comments: distance limited by transporter bed in dias to take pt to test.     Balance  Posture: Good  Sitting - Static: Good  Sitting - Dynamic: Good  Standing - Static: Good  Standing - Dynamic: Good         AM-PAC - Mobility  AM-PAC Basic Mobility - Inpatient   How much help is needed turning from your back to your side while in a flat bed without using bedrails?: A Little  How much help is needed moving from lying on your back to sitting on the side of a flat bed without using bedrails?: A Little  How much help is needed moving to and from a bed to a chair?: A Little  How much help is needed standing up from a chair using your arms?: A Little  How much help is needed walking in hospital room?: A Little  How much help is needed climbing 3-5 steps with a railing?: A Little  AM-[facility] Inpatient Mobility Raw Score : 18  AM-PAC Inpatient T-Scale Score : 43.63  Mobility Inpatient CMS 0-100% Score: 46.58  Mobility Inpatient CMS G-Code Modifier : CK    Goals  Short Term Goals  Time Frame for Short Term Goals: d/c  Short Term Goal 1: sup<>sit supervision  Short Term Goal 2: sit<>stand supervision  Short Term Goal 3:

## 2024-06-27 NOTE — CONSULTS
Chart reviewed, events noted, and I have personally performed a face-to-face diagnostic evaluation on this patient. I have personally reviewed CNP's note and confirmed the documented past medical history, family history, social history, allergies, home medications, review of systems, vital signs, laboratory values, and hospital medications via my own chart review, in person with the patient, and/or in person with his family members as appropriate.  My findings are as follows:    Assessment  89yo man with very high level of function at baseline and poorly controlled BP presented to ER with second episode of significant retrograde and anterograde amnesia  Overall description of his episodes sound fairly consistent with transient global amnesia (TGA)  Unusual for that is that, although TGA has about a 5% per year annual recurrence, he has had two episodes so close together  Also unusual is that he has very notable memory difficulty when he becomes stressed/anxious, which would not be expected with TGA  Additional consideration is that his BP runs very high and during times of stress, it may be spiking much higher and leading to hypertensive encephalopathy  Because of the intermittent symptoms with return to baseline, TIA and seizure are considerations  This is not TIA, however, because symptoms are not consistent with TIA/stroke  Very unusual for seizure as well given the semiology described during these events  BP needs to be much better controlled overall and so if these episodes resolve with better BP control then most likely this is the cause  Discussed starting Keppra after EEG since consequence of seizure could be catastrophic at his age  If, once BP is very consistently controlled, there are no more episodes, then trial off Keppra can be considered  Pt also given options not to start Keppra and control BP instead, and then initiate Keppra if spells continue; he would prefer to start Keppra  this is difficult to say. His HTN may be playing a role, and this will be managed regardless. Stress and anxiety are also possibilities but would be diagnoses of exclusion.     RECOMMENDATIONS:  - Resume home Plavix and Eliquis (ordered, confirmed home meds with pt's daughters)  - Routine EEG (ordered)  - BP goal less than 130/80 mmHg  - Will trial  mg BID as discussed w/ family (ordered)  - Discussed driving precautions w/ his daughters Dianne & Melita -both expressed understanding  - PT/OT/SLP to evaluate safety for returning home  - Follow up with [facility] for cognitive eval  - He can follow up with his PCP for anxiety. I would avoid benzodiazepines UNLESS he has a convulsive seizure given risk for paradoxical reaction in elderly.   - F/U with neurovascular surgery at [facility] for longstanding BRYCE    Management and plan discussed with:   Dr. Veronica Guerrero, JACINTO - [facility]   Neurology  6/27/2024 2:10 PM  [facility]: [facility] Neurology    History of Present Illness     Cj Whitaker is a 89 y.o. y/o male with history significant for atrial fibrillation, CAD, critical left ICA carotid stenosis (on Plavix at home), severe ICAD, HTN who presents with transient amnesia.     Per my interview with the patient: he has a significant gap in his memory. He has never had a memory problem before this. All he remembers is waking up in an ambulance. He cannot even remember the last thing he remembered prior to the ambulance.    His daughters tell me they called him and was reporting severe generalized weakness. These events seem to be precipitated by stress. They are associated with hyperventilation and become glassy-eyed. The amnesia would then follow. He didn't know where he was or what he was doing. He kept asking the same question about where he was (he spends half his time in [place] and half his time in [place], just recently came home from [place] in May). He kept asking where his wife was. He slept for four hours, and then

## 2024-06-27 NOTE — PROGRESS NOTES
4 Eyes Skin Assessment     NAME:  Cj Whitaker  YOB: 1931  MEDICAL RECORD NUMBER:  [id number]    The patient is being assessed for  Admission    I agree that at least one RN has performed a thorough Head to Toe Skin Assessment on the patient. ALL assessment sites listed below have been assessed.      Areas assessed by both nurses:    Head, Face, Ears, Shoulders, Back, Chest, Arms, Elbows, Hands, Sacrum. Buttock, Coccyx, Ischium, and Legs. Feet and Heels        Does the Patient have a Wound? No noted wound(s)       Cristiano Prevention initiated by RN: No  Wound Care Orders initiated by RN: No    Pressure Injury (Stage 3,4, Unstageable, DTI, NWPT, and Complex wounds) if present, place Wound referral order by RN under : No    New Ostomies, if present place, Ostomy referral order under : No     Nurse 1 eSignature: Electronically signed by Elise De Oliveira RN on 6/27/24 at 10:21 AM EDT    **SHARE this note so that the co-signing nurse can place an eSignature**    Nurse 2 eSignature: Electronically signed by Juancarlos Barrera RN on 6/27/24 at 4:04 PM EDT

## 2024-06-27 NOTE — ED PROVIDER NOTES
[facility] ED  EMERGENCY DEPARTMENT ENCOUNTER        Pt Name: Cj Whitaker  MRN: [id number]  Birthdate 5/13/1931  Date of evaluation: 6/26/2024  Provider: JACINTO Griffin - GUMARO  PCP: Chavez Valentin MD    This patient was seen and evaluated by the attending physician Dr. Mckinney.    I have evaluated this patient. My supervising physician was available for consultation.      CHIEF COMPLAINT       Chief Complaint   Patient presents with    Muscle Pain     Pain in right posterior shoulder, and having \"spells\" where he just \"zones out\" and then returns.  Wife is in nursing home with dementia and they are trying to decide if she will be able to come home.  Pt under lot of stress right now as per daughter.  Pt took a flexeril and pain better, but comes and goes.       HISTORY OF PRESENT ILLNESS   (Location/Symptom, Timing/Onset, Context/Setting, Quality, Duration, Modifying Factors, Severity)  Note limiting factors.     History from : Patient and Family patients daughter  Cj Whitaker is a 89 y.o. male who presents via private car for confusion. Onset was this evening. Duration has been improved since the onset. Context includes patient presents to the emergency department this evening after having an episode of confusion and memory loss.  He was visiting his wife who is currently residing in a nursing home and stated he developed pain to his right shoulder and decided that he was going to go home and lie down.  He does state that he has had pain in the shoulder before.  He went to his car at about 1730 this evening and at 1930 realized that he was at home, he is unsure of how he got there, he does not remember driving home.  He did call his daughter who recommended that he come to the emergency department.  Patient is anticoagulated on Eliquis.  Denies chest pain, palpitations or swelling his extremities recently.  He denies shortness of breath, cough congestion or fevers.  He denies

## 2024-06-27 NOTE — PROGRESS NOTES
Occupational Therapy  Facility/Department: [address]  Occupational Therapy Initial Assessment and Treatment      Name: Cj Whitaker  : 1931  MRN: [id number]  Date of Service: 2024    Discharge Recommendations:  Home with assist PRN  OT Equipment Recommendations  Equipment Needed: No       Patient Diagnosis(es): There were no encounter diagnoses.      Treatment Diagnosis: impaired ADLs/transfers      Assessment   Performance deficits / Impairments: Decreased ADL status;Decreased functional mobility   Assessment: Presenting w/ transient amnesia. At baseline, lives w/ spouse (spouse presently at SNF). Pt independent w/ ADLs, IADLs, and ambulates w/o AD. Today, pt functioning at SBA. Evaluation was abbreviated, but pt appears to be functioning near his baseline - A. Pt/family encouraged to request OOB and ambulation w/ staff when pt returns to room. Dtrs present during evaluation and appear to be quite attentive to pt and his needs. Anticipate home w/ A prn. No DME needs. Will continue to follow during admission.  Treatment Diagnosis: impaired ADLs/transfers  Prognosis: Good  Decision Making: Low Complexity  REQUIRES OT FOLLOW-UP: Yes  Activity Tolerance  Activity Tolerance: Patient Tolerated treatment well  Activity Tolerance Comments: limited evaluation - due to neurology MD consult and transportation arriving to take pt off floor for EEG        Plan   Occupational Therapy Plan  Times Per Week: 2-5  Current Treatment Recommendations: Strengthening, Balance training, Functional mobility training, Safety education & training, Self-Care / ADL, Home management training     Restrictions  Position Activity Restriction  Other position/activity restrictions: up with assist    Subjective   General  Chart Reviewed: Yes  Additional Pertinent Hx: 89 y.o. M who presents with transient amnesia (was visiting his wife at NH).     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     PMH: arthritis, asthma, Baker's cyst, bilateral carotid artery stenosis, coronary artery disease, chronic kidney disease, history of skin cancer, COPD, GERD, heart murmur, history of blood clots, hyperlipidemia, hypertension, kidney stones, neuropathy, nonrheumatic aortic valve stenosis, pancreatic lesion.  Referring Practitioner: Trey Lazaro MD    denies c/o pain    Subjective: In bed on arrival. Dtrs at bedside. Pt agreeable to therapy session.     Social/Functional History  Social/Functional History  Lives With: Spouse  Type of Home: Apartment  Home Layout: One level  Home Access: Level entry  Bathroom Shower/Tub: Walk-in shower  Bathroom Toilet: Handicap height  Bathroom Equipment: Grab bars in shower, Shower chair, Grab bars around toilet  Bathroom Accessibility: Accessible  Home Equipment: Cane  Has the patient had two or more falls in the past year or any fall with injury in the past year?: No  ADL Assistance: Independent  Homemaking Assistance: Independent  Ambulation Assistance: Independent  Transfer Assistance: Independent  Active : Yes  Occupation: Retired  Additional Comments: Wife is currently in a rehab facility. 2 daughters able to assist at d/c.       Objective                Safety Devices  Type of Devices: Gait belt;Nurse notified;Left in bed (left on stretcher w/ transporter assist, RN aware)    Balance  Sitting: Intact  Standing:  (SBA)    Toilet Transfers  Toilet - Technique: Ambulating  Equipment Used: Standard toilet (w/ bar)  Toilet Transfer: Stand by assistance    AROM: Within functional limits  Strength: Within functional limits  Coordination: Within functional limits    ADL  Grooming: Stand by assistance  Grooming Skilled Clinical Factors: washing hands at sink level,

## 2024-06-28 VITALS
OXYGEN SATURATION: 95 % | DIASTOLIC BLOOD PRESSURE: 61 MMHG | BODY MASS INDEX: 27.11 KG/M2 | HEIGHT: 70 IN | HEART RATE: 69 BPM | SYSTOLIC BLOOD PRESSURE: 123 MMHG | RESPIRATION RATE: 18 BRPM | TEMPERATURE: 97.7 F | WEIGHT: 189.38 LBS

## 2024-06-28 LAB — VIT B12 SERPL-MCNC: 1417 PG/ML (ref 211–911)

## 2024-06-28 PROCEDURE — 4A10X4Z MONITORING OF CENTRAL NERVOUS ELECTRICAL ACTIVITY, EXTERNAL APPROACH: ICD-10-PCS | Performed by: PSYCHIATRY & NEUROLOGY

## 2024-06-28 PROCEDURE — 6370000000 HC RX 637 (ALT 250 FOR IP): Performed by: INTERNAL MEDICINE

## 2024-06-28 PROCEDURE — G0378 HOSPITAL OBSERVATION PER HR: HCPCS

## 2024-06-28 PROCEDURE — 99232 SBSQ HOSP IP/OBS MODERATE 35: CPT

## 2024-06-28 PROCEDURE — 6370000000 HC RX 637 (ALT 250 FOR IP): Performed by: NURSE PRACTITIONER

## 2024-06-28 PROCEDURE — 2580000003 HC RX 258: Performed by: INTERNAL MEDICINE

## 2024-06-28 PROCEDURE — 92526 ORAL FUNCTION THERAPY: CPT

## 2024-06-28 PROCEDURE — 94640 AIRWAY INHALATION TREATMENT: CPT

## 2024-06-28 PROCEDURE — 6360000002 HC RX W HCPCS: Performed by: INTERNAL MEDICINE

## 2024-06-28 RX ORDER — LEVETIRACETAM 500 MG/1
500 TABLET ORAL 2 TIMES DAILY
Qty: 60 TABLET | Refills: 3 | Status: SHIPPED | OUTPATIENT
Start: 2024-06-28

## 2024-06-28 RX ADMIN — ARFORMOTEROL TARTRATE 15 MCG: 15 SOLUTION RESPIRATORY (INHALATION) at 09:46

## 2024-06-28 RX ADMIN — FINASTERIDE 5 MG: 5 TABLET, FILM COATED ORAL at 08:33

## 2024-06-28 RX ADMIN — AMLODIPINE BESYLATE 2.5 MG: 2.5 TABLET ORAL at 08:33

## 2024-06-28 RX ADMIN — LOSARTAN POTASSIUM 50 MG: 50 TABLET, FILM COATED ORAL at 08:33

## 2024-06-28 RX ADMIN — ALLOPURINOL 100 MG: 100 TABLET ORAL at 08:33

## 2024-06-28 RX ADMIN — LEVETIRACETAM 500 MG: 500 TABLET, FILM COATED ORAL at 08:33

## 2024-06-28 RX ADMIN — APIXABAN 2.5 MG: 2.5 TABLET, FILM COATED ORAL at 08:33

## 2024-06-28 RX ADMIN — CLOPIDOGREL BISULFATE 75 MG: 75 TABLET ORAL at 08:33

## 2024-06-28 RX ADMIN — SODIUM CHLORIDE, PRESERVATIVE FREE 5 ML: 5 INJECTION INTRAVENOUS at 00:25

## 2024-06-28 RX ADMIN — GABAPENTIN 300 MG: 300 CAPSULE ORAL at 08:33

## 2024-06-28 RX ADMIN — BUDESONIDE 500 MCG: 0.5 SUSPENSION RESPIRATORY (INHALATION) at 09:46

## 2024-06-28 RX ADMIN — SODIUM CHLORIDE, PRESERVATIVE FREE 10 ML: 5 INJECTION INTRAVENOUS at 08:34

## 2024-06-28 RX ADMIN — DORZOLAMIDE HYDROCHLORIDE AND TIMOLOL MALEATE 1 DROP: 20; 5 SOLUTION/ DROPS OPHTHALMIC at 08:42

## 2024-06-28 ASSESSMENT — PAIN SCALES - GENERAL: PAINLEVEL_OUTOF10: 0

## 2024-06-28 NOTE — PROGRESS NOTES
Patient to main entrance via WC where daughter Dianne is waiting. Patient in NAD on RA at time of departure

## 2024-06-28 NOTE — PROGRESS NOTES
Speech Language Pathology  Facility/Department:[facility]  Dysphagia treat/dc    Name: Cj Whitaker  : 1931  MRN: [id number]                                                     Patient Diagnosis(es):   Patient Active Problem List    Diagnosis Date Noted    Severe aortic stenosis 2022    Left carotid artery occlusion 2022    CAD S/P percutaneous coronary angioplasty 2022    Asthma-COPD overlap syndrome (HCC) 2022    Stage 3a chronic kidney disease (HCC) 2022    Torticollis 2022    Transient alteration of awareness 2024     Past Medical History:   Diagnosis Date    Arthritis     Asthma     Baker's cyst     Bilateral carotid artery stenosis     CAD (coronary artery disease)     Cancer (HCC)     Left cheek and right ear    Chronic kidney disease, stage 3a (HCC)     COPD (chronic obstructive pulmonary disease) (HCC)     GERD (gastroesophageal reflux disease)     Hearing loss     Heart murmur     Hx of blood clots     Hyperlipidemia     Hyperlipidemia     Hypertension     Kidney stones     Neuropathy     Nonrheumatic aortic (valve) stenosis     Pancreatic lesion      Past Surgical History:   Procedure Laterality Date    APPENDECTOMY      CARDIAC SURGERY  2022    TAVR    CARPAL TUNNEL RELEASE      EYE SURGERY  2012    Left Eye Cataract Removal    KNEE SURGERY      right knee    SHOULDER SURGERY      right    TONSILLECTOMY         History of Present Illness  Per MD notes:  \" Past Medical History in prose (no negatives)    has a past medical history of Arthritis, Asthma, Baker's cyst, Bilateral carotid artery stenosis, CAD (coronary artery disease), Cancer (HCC), Chronic kidney disease, stage 3a (HCC), COPD (chronic obstructive pulmonary disease) (HCC), GERD (gastroesophageal reflux disease), Hearing loss, Heart murmur, blood clots, Hyperlipidemia, Hyperlipidemia, Hypertension, Kidney stones, Neuropathy, Nonrheumatic aortic (valve) stenosis, and Pancreatic  or aphasia noted. Pt followed directions, answered yes/no questions and provided biographical information accurately.   Pt reports only concern is he has no memory of what happened to him that brought him to hospital.  MRI is negative for acute abnormality    Treatment:  Dysphagia Goals:  The pt will be seen  to address the following goals:   Goal 1: Patient will tolerate least restrictive diet with adequate oral prep and without overt signs of aspiration or associated decline in respiratory status  6/28: pt sitting up in chair eating breakfast consisting of eggs, sausage, Nigerien toast and fruit as well as liquids. No overt signs of aspiration, voice clear. Pt demonstrated adequate mastication with all solids, with effective clearance of oral cavity. No respiratory decline per chart review  Goal met  Goal 2: Patient/caregiver will demonstrate understanding of dysphagia recommendations/concerns.  6/27: Educated pt to purpose of visit, s/s of aspiration, concern if aspiration occurs and rationale for diet recommendation/strategies to reduce risk for aspiration. Pt instructed to notify staff if any signs of aspiration emerge or dysphagia concerns. Pt stated comprehension   Cont goal  6/28: reviewed education from prior session. Pt instructed to notify staff if any difficulty with swallowing or speech emerges. Pt stated comprehension  Goal met    Speech Goals:  Full eval not indicated pt appears at baseline at this time  Per neuro note: \" Overall description of his episodes sound fairly consistent with transient global amnesia (TGA) \"    Education  As above    Total treatment time: 15 dys treat    Plan:   Recommended Diet: cont regular diet  Medication administration: whole with water    Strategies:   Upright all meals    Discharge Recommendation: no follow up indicated  Discussed with CELINA Olivares  Needs met prior to leaving room, call light within reach    GHAZAL ZAZUETA M.S./CCC-SLP #[id number]  Pg. # [phone]

## 2024-06-28 NOTE — DISCHARGE SUMMARY
V2.0  Discharge Summary    Name:  Cj Whitaker /Age/Sex: 1931 (89 y.o. male)   Admit Date: 2024  Discharge Date:  24     MRN & CSN:  [id number] & [id number] Encounter Date and Time 24 9:13 AM EDT    Attending:  Enrique Lazaro MD Discharging Provider: Enrique Lazaro MD       Hospital Course:     Brief HPI: Cj Whitaker is a 89 y.o. male who presented with  pmh of PVD, aortic stenosis, asthma, atrial fibrillation, CAD, COPD, DVT, carotid stenosis, hypertension, hyperlipidemia, chronic back pain, JUSTINE and history of aortic transcatheter replacement with UMANG(2022) as well as coronary angiogram with stent placement(2019), on chronic anticoagulation with Eliquis who presents with memory impairment.     Brief Problem Based Course:     Acute memory loss/amnesia:        MRI brain no stroke  Concern for possible seizure versus markedly elevated blood pressure and encephalopathy  Discussed with neurology  EEG No Sz activity  Keppra 500 mg twice daily started   Neurology did not recommend any antiplatelet therapy at this time  F/u Neurology 1 week   Follow-up with PCP for improved blood pressure control      The patient expressed appropriate understanding of, and agreement with the discharge recommendations, medications, and plan.     Consults this admission:  IP CONSULT TO NEUROLOGY  IP CONSULT TO PHARMACY    Discharge Diagnosis:   Transient alteration of awareness  Rule out seizure  Hypertension  Atrial fibrillation  Secondary hypercoagulable state due to atrial fibrillation on Eliquis  Hyperlipidemia  COPD without exacerbation  Carotid artery stenosis  Coronary artery disease status post stent 2019    Discharge Instruction:   Follow up appointments: neurology 1 week  Primary care physician: Chavez Valentin MD within 2 weeks  Diet: regular diet   Activity: eizure Driving Risk: Having a Seizure while driving puts you at risk for injuring yourself or others. If you drive  center on 06/26/2024 at 11:25 p.m..     Result Date: 6/26/2024  EXAMINATION: CT OF THE HEAD WITHOUT CONTRAST  6/26/2024 11:03 pm TECHNIQUE: CT of the head was performed without the administration of intravenous contrast. Automated exposure control, iterative reconstruction, and/or weight based adjustment of the mA/kV was utilized to reduce the radiation dose to as low as reasonably achievable. COMPARISON: None. HISTORY: ORDERING SYSTEM PROVIDED HISTORY: Stroke Symptoms TECHNOLOGIST PROVIDED HISTORY: Has a \"code stroke\" or \"stroke alert\" been called?->Yes Reason for exam:->Stroke Symptoms Decision Support Exception - unselect if not a suspected or confirmed emergency medical condition->Emergency Medical Condition (MA) Reason for Exam: code stroke FINDINGS: BRAIN/VENTRICLES: The ventricles are mildly enlarged there is diffuse mild prominence of the cortical sulci which is more apparent.  There is mild periventricular low density bilaterally which is more prominent.  No intracranial hemorrhage or edema is seen.  There is no extra-axial fluid collection or mass. ORBITS: The visualized portion of the orbits demonstrate no acute abnormality. SINUSES: The visualized paranasal sinuses and mastoid air cells demonstrate no acute abnormality. SOFT TISSUES/SKULL:  No acute abnormality of the visualized skull or soft tissues.     Mild atrophy and mild chronic microischemic changes scattered in the deep white matter which is more prominent with no acute intracranial abnormality seen.     XR CHEST (2 VW)    Result Date: 6/26/2024  EXAMINATION: TWO XRAY VIEWS OF THE CHEST 6/26/2024 9:44 pm COMPARISON: 11/20/2009 HISTORY: ORDERING SYSTEM PROVIDED HISTORY: right posterior shoulder pain TECHNOLOGIST PROVIDED HISTORY: \"IF\" patient is in hallway or waiting room. Reason for exam:->right posterior shoulder pain Reason for Exam: right posterior shoulder pain FINDINGS: The lungs appear clear.  The heart appears unremarkable.  There is a

## 2024-06-28 NOTE — PROGRESS NOTES
NEUROLOGY PROGRESS NOTE       Patient Name: Cj Whitaker YOB: 1931   Sex: Male Age: 89 yrs     CC / Reason for Consult: amnesia    Changes over last 24 hours:   Neuro exam stable. No episodes of amnesia overnight. Routine EEG read as normal.     ROS: No complaints at this time.     ASSESSMENT & RECOMMENDATIONS   Assessment:  Cj Whitaker is a 89 y/o man with afib, CAD, RBYCE, ICAD, and HTN who presents with a recurrent spell of amnesia. Concern for seizure vs hypertensive encephalopathy. Stress and anxiety may also play a role. Patient neurologically intact on exam. No episodes of amnesia overnight. BP well controlled. Routine EEG read as normal.      Plan:  Continue Keppra 500mg BID  F/u with [facility] on discharge  Reiterated to patieht that he cannot engage in any activty where loss of consciousness would be dangerous to himsel for others (e.g. driving, climbing, swimming alone, etc) until cleared by his neurologist. Patient expressed understanding and agreement.    No further inpatient neurologic work up needed at this point. Neurology will sign off. Please call with any questions.      Akshat Palacios, JACINTO - CNP   Neurology  6/28/2024 9:18 AM  [facility]: [facility]    HISTORY   Interval History: BP better controlled overnight. No recurrent spells of amnesia overnight. Routine EEG normal.     PMH Past Medical History:   Diagnosis Date    Arthritis     Asthma     Baker's cyst     Bilateral carotid artery stenosis     CAD (coronary artery disease)     Cancer (HCC)     Left cheek and right ear    Chronic kidney disease, stage 3a (HCC)     COPD (chronic obstructive pulmonary disease) (HCC)     GERD (gastroesophageal reflux disease)     Hearing loss     Heart murmur     Hx of blood clots     Hyperlipidemia     Hyperlipidemia     Hypertension     Kidney stones     Neuropathy     Nonrheumatic aortic (valve) stenosis     Pancreatic lesion       Allergies Allergies   Allergen  distal vertebral arteries.    PHYSICAL EXAMINATION     PHYSICAL EXAM:  Vitals:    06/27/24 1945 06/27/24 2309 06/28/24 0515 06/28/24 0831   BP: (!) 144/62 (!) 140/60 (!) 121/59 123/61   Pulse: 74 81 59 69   Resp: 20 14 20 18   Temp: 98.1 °F (36.7 °C) 97.7 °F (36.5 °C) 97.6 °F (36.4 °C) 97.7 °F (36.5 °C)   TempSrc: Oral Oral Oral    SpO2: 95% 96% 94% 94%   Weight:       Height:             General: Alert, no distress, well-nourished  Neurologic  Mental status: oriented x4, follows all commands, clear, appropriate speech    Cranial nerves:   CN2: Visual fields full w/o extinction on confrontational testing   CN 3,4,6: Pupils equal and reactive to light, extraocular muscles intact  CN5: Facial sensation symmetric   CN7: Face symmetric  CN8: Hearing symmetric to spoken voice  CN9: Palate elevated symmetrically  CN11: Traps full strength on shoulder shrug  CN12: Tongue midline with protrusion    Motor Exam:  5/5 strength throughout    Sensory: light touch intact and symmetric in all 4 extremities.  No sensory extinction on bilateral simultaneous stimulation  Cerebellar/coordination: finger nose finger normal without ataxia  Tone: normal in all 4 extremities  Gait: held 2/2 patient safety    OTHER SYSTEMS:  Cardiovascular: Warm, appears well perfused   Respiratory: Easy, non-labored respiratory pattern   Abdominal: Abdomen is without distention   Extremities: Upper and lower extremities are atraumatic in appearance without deformity. No swelling or erythema

## 2024-06-28 NOTE — CARE COORDINATION
Case Management Assessment  Initial Evaluation    Date/Time of Evaluation: 6/28/2024 9:22 AM  Assessment Completed by: Karmen Mendoza    If patient is discharged prior to next notation, then this note serves as note for discharge by case management.    Patient Name: Cj Whitaker                   YOB: 1931  Diagnosis: Stroke, acute, thrombotic (HCC) [I63.9]                   Date / Time: 6/27/2024  7:56 AM    Patient Admission Status: Inpatient   Readmission Risk (Low < 19, Mod (19-27), High > 27): Readmission Risk Score: 8.6    Current PCP: Chavez Valentin MD  PCP verified by CM? Yes    Chart Reviewed: Yes      History Provided by: Patient  Patient Orientation: Alert and Oriented    Patient Cognition: Alert    Hospitalization in the last 30 days (Readmission):  No    If yes, Readmission Assessment in CM Navigator will be completed.    Advance Directives:      Code Status: Full Code   Patient's Primary Decision Maker is: Legal Next of Kin      Discharge Planning:    Patient lives with: Spouse/Significant Other Type of Home: Apartment  Primary Care Giver: Self  Patient Support Systems include: Spouse/Significant Other, Children   Current Financial resources: Medicare  Current community resources: None  Current services prior to admission: None            Current DME:              Type of Home Care services:  OT, PT    ADLS  Prior functional level: Independent in ADLs/IADLs  Current functional level: Independent in ADLs/IADLs    PT AM-PAC: 18 /24  OT AM-PAC: 18 /24    Family can provide assistance at DC: Yes  Would you like Case Management to discuss the discharge plan with any other family members/significant others, and if so, who? Yes (DTRs)  Plans to Return to Present Housing: Yes  Other Identified Issues/Barriers to RETURNING to current housing: None  Potential Assistance needed at discharge: Home Care            Potential DME:    Patient expects to discharge to: Apartment  Plan for

## 2024-06-28 NOTE — PLAN OF CARE
Problem: Discharge Planning  Goal: Discharge to home or other facility with appropriate resources  Outcome: Completed     Problem: Safety - Adult  Goal: Free from fall injury  Outcome: Completed     Problem: Neurosensory - Adult  Goal: Achieves stable or improved neurological status  Outcome: Completed  Goal: Achieves maximal functionality and self care  Outcome: Completed     Problem: ABCDS Injury Assessment  Goal: Absence of physical injury  Outcome: Completed     Problem: Pain  Goal: Verbalizes/displays adequate comfort level or baseline comfort level  Outcome: Completed

## 2024-06-28 NOTE — PROCEDURES
PROCEDURE NOTE  Date: 6/28/2024   Name: Cj Whitaker  YOB: 1931    EEG awake and asleep    Date/Time: 6/28/2024 8:26 AM    Performed by: Nasra Jimenez MD  Authorized by: Symone Guerrero APRN - CNP          CLINICAL HISTORY:  Cj Whitaker is a 89 y.o. man with afib; CAD; BRYCE; HTN.  Presented to ER with recurrent spell of amnesia. Patient remembers waking up in the ambulance. He does not recall what happened before that. His daughters called him and he was reporting feeling generally weak. He was unable to remember things that were being told to him and was repeating questions. He was admitted for a similar episode while in [place] recently. He had returned to his baseline quickly.   Clinical concern is for complex partial or simple partial seizures or subclinical status epilepticus.    FINDINGS: This is a routine 16 channel referential and bipolar video EEG. There is a robust background rhythm slowed slightly into the theta range which is appropriate for his age. Photic stimulation is performed without driving or abnormality.  Hyperventilation is not performed.  No epileptiform abnormalities are noted. No electrographic seizures are recorded. There is no asymmetry.    IMPRESSION:  This is a normal EEG in the awake, and drowsy states.  No focal or epileptiform abnormalities.